# Patient Record
Sex: MALE | Race: WHITE | Employment: FULL TIME | RURAL
[De-identification: names, ages, dates, MRNs, and addresses within clinical notes are randomized per-mention and may not be internally consistent; named-entity substitution may affect disease eponyms.]

---

## 2018-07-05 ENCOUNTER — OFFICE VISIT (OUTPATIENT)
Dept: FAMILY MEDICINE CLINIC | Age: 48
End: 2018-07-05

## 2018-07-05 VITALS
HEIGHT: 71 IN | BODY MASS INDEX: 32.2 KG/M2 | RESPIRATION RATE: 20 BRPM | HEART RATE: 110 BPM | OXYGEN SATURATION: 97 % | SYSTOLIC BLOOD PRESSURE: 115 MMHG | WEIGHT: 230 LBS | DIASTOLIC BLOOD PRESSURE: 82 MMHG | TEMPERATURE: 97.8 F

## 2018-07-05 DIAGNOSIS — R00.0 TACHYCARDIA: ICD-10-CM

## 2018-07-05 DIAGNOSIS — I50.9 CONGESTIVE HEART FAILURE, UNSPECIFIED HF CHRONICITY, UNSPECIFIED HEART FAILURE TYPE (HCC): Primary | ICD-10-CM

## 2018-07-05 RX ORDER — METOPROLOL SUCCINATE 50 MG/1
50 TABLET, EXTENDED RELEASE ORAL DAILY
Qty: 30 TAB | Refills: 0 | Status: SHIPPED | OUTPATIENT
Start: 2018-07-05 | End: 2018-07-24 | Stop reason: SDUPTHER

## 2018-07-05 RX ORDER — FUROSEMIDE 20 MG/1
20 TABLET ORAL DAILY
Qty: 30 TAB | Refills: 0 | Status: SHIPPED | OUTPATIENT
Start: 2018-07-05 | End: 2018-07-10 | Stop reason: DRUGHIGH

## 2018-07-05 RX ORDER — FUROSEMIDE 20 MG/1
20 TABLET ORAL
COMMUNITY
Start: 2018-07-02 | End: 2018-07-05 | Stop reason: SDUPTHER

## 2018-07-05 NOTE — MR AVS SNAPSHOT
[facility] 
[phone] Patient: Steve Malloy MRN: [id number] RVW:4/96/2904 Visit Information Date & Time Provider Department Dept. Phone Encounter #  
 7/5/2018  8:20 AM Juvenal Ceballos  [facility] [phone] [id number] Your Appointments 7/10/2018  4:20 PM  
Any with Juvenal Ceballos MD  
[address] [place] [address] ([facility]) Appt Note: f/u medication $25 CP mbm  
 [phone] [phone]  
[phone]  
  
   
 [address] [address] Upcoming Health Maintenance Date Due DTaP/Tdap/Td series (1 - Tdap) 2/25/1989 FOBT Q 1 YEAR AGE 50-75 2/25/2018 Influenza Age 5 to Adult 8/1/2018 Allergies as of 7/5/2018  Review Complete On: 7/5/2018 By: Jayro Espinoza LPN No Known Allergies Current Immunizations  Never Reviewed No immunizations on file. Not reviewed this visit You Were Diagnosed With   
  
 Codes Comments Congestive heart failure, unspecified HF chronicity, unspecified heart failure type ([facility]    -  Primary ICD-10-CM: I50.9 ICD-9-CM: 428.0 Tachycardia     ICD-10-CM: R00.0 ICD-9-CM: 180. 0 Vitals BP Pulse Temp Resp Height(growth percentile) Weight(growth percentile) 115/82 (BP 1 Location: Right arm, BP Patient Position: Sitting) (!) 110 97.8 °F (36.6 °C) (Oral) 20 5' 11\" (1.803 m) 230 lb (104.3 kg) SpO2 BMI Smoking Status 97% 32.08 kg/m2 Former Smoker BMI and BSA Data Body Mass Index Body Surface Area 32.08 kg/m 2 2.29 m 2 Preferred Pharmacy Pharmacy Name Phone 575 [facility],7Th Floor, [address] [place] [address]  [phone] Your Updated Medication List  
  
   
This list is accurate as of 7/5/18  9:21 AM.  Always use your most recent med list.  
  
  
  
  
 furosemide 20 mg tablet Commonly known as:  LASIX Take 1 Tab by mouth daily for 5 days. metoprolol succinate 50 mg XL tablet Commonly known as:  TOPROL-XL Take 1 Tab by mouth daily. Prescriptions Sent to Pharmacy Refills  
 furosemide (LASIX) 20 mg tablet 0 Sig: Take 1 Tab by mouth daily for 5 days. Class: Normal  
 Pharmacy: [address] [place] [address] Dr Ph #: [phone] Route: Oral  
 metoprolol succinate (TOPROL-XL) 50 mg XL tablet 0 Sig: Take 1 Tab by mouth daily. Class: Normal  
 Pharmacy: [address] [place] [address] Dr Ph #: [phone] Route: Oral  
  
We Performed the Following AMB POC EKG ROUTINE W/ 12 LEADS, INTER & REP [54966 CPT(R)] BNP K034689 CPT(R)] CBC WITH AUTOMATED DIFF [08620 CPT(R)] COLLECTION VENOUS BLOOD,VENIPUNCTURE [id number] CPT(R)] LIPID PANEL WITH LDL/HDL RATIO [79042 CPT(R)] METABOLIC PANEL, BASIC [23282 CPT(R)] MO HANDLG&/OR CONVEY OF SPEC FOR TR OFFICE TO LAB [46828 CPT(R)] REFERRAL TO CARDIOLOGY [EPP57 Custom] To-Do List   
 07/05/2018 ECHO:  COLOR FLOW MAPPING ADULT Referral Information Referral ID Referred By Referred To  
  
 [id number] [facility], Antione Wang MD   
   [address] [place] [address] Way Phone: [phone] Fax: [phone] Visits Status Start Date End Date 1 New Request 7/5/18 7/5/19 If your referral has a status of pending review or denied, additional information will be sent to support the outcome of this decision. Introducing [facility] & HEALTH SERVICES! [facility] introduces Hastify patient portal. Now you can access parts of your medical record, email your doctor's office, and request medication refills online. 1. In your internet browser, go to [email or web address] 2. Click on the First Time User? Click Here link in the Sign In box.  You will see the New Member Sign Up page. 3. Enter your Boke Access Code exactly as it appears below. You will not need to use this code after youve completed the sign-up process. If you do not sign up before the expiration date, you must request a new code. · Boke Access Code: [email or web address] Expires: 10/3/2018  8:17 AM 
 
4. Enter the last four digits of your Social Security Number (xxxx) and Date of Birth (mm/dd/yyyy) as indicated and click Submit. You will be taken to the next sign-up page. 5. Create a BBC Easyt ID. This will be your Boke login ID and cannot be changed, so think of one that is secure and easy to remember. 6. Create a Boke password. You can change your password at any time. 7. Enter your Password Reset Question and Answer. This can be used at a later time if you forget your password. 8. Enter your e-mail address. You will receive e-mail notification when new information is available in [address] 9. Click Sign Up. You can now view and download portions of your medical record. 10. Click the Download Summary menu link to download a portable copy of your medical information. If you have questions, please visit the Frequently Asked Questions section of the Boke website. Remember, Boke is NOT to be used for urgent needs. For medical emergencies, dial 911. Now available from your iPhone and Android! Please provide this summary of care documentation to your next provider. If you have any questions after today's visit, please call [phone].

## 2018-07-05 NOTE — PROGRESS NOTES
HISTORY OF PRESENT ILLNESS  Chelsea Avalos is a 46 y.o. male. Chief Complaint   Patient presents with    \[facility]\"" Care     [facility] ED - pulmoonary edema, tachy       HPI  Went to ER 3 d ago  Chapis Girard there d/t cough for 3 w  That was not getting better, productive, clear to yellow mucus  Diagnosed with CHF  On Lasix 20 mg now, got 5 pills total only  Coughing and orthopnea getting a little better now  Weight stable    No recent tick bites or infections  Had one 4 y ago and was treated for Lyme ds then    FH: Uncle with enlarged heart age 28    SH: quit smoking in Feb, ETOH 6-8 beer on weekends, no drugs    Had EKG done, no Echo  Nl Troponin  High BNP  CXR showed a little emphysema only      Review of Systems   Constitutional: Negative for fever. HENT: Negative for congestion and sore throat. Respiratory: Positive for cough, sputum production, shortness of breath and wheezing (at night when lying down). Cardiovascular: Positive for orthopnea and leg swelling (for a mo). Negative for chest pain and palpitations. Gastrointestinal: Negative for abdominal pain, blood in stool, diarrhea, nausea and vomiting. Genitourinary: Negative for dysuria and hematuria. Musculoskeletal: Negative for joint pain and myalgias. Neurological: Negative for dizziness and headaches. No past medical history on file. No past surgical history on file. Current Outpatient Prescriptions   Medication Sig Dispense Refill    metoprolol succinate (TOPROL-XL) 50 mg XL tablet Take 1 Tab by mouth daily. 30 Tab 0    furosemide (LASIX) 20 mg tablet Take 1 Tab by mouth daily for 5 days. 30 Tab 0         No Known Allergies    Visit Vitals    /82 (BP 1 Location: Right arm, BP Patient Position: Sitting)    Pulse (!) 110    Temp 97.8 °F (36.6 °C) (Oral)    Resp 20    Ht 5' 11\" (1.803 m)    Wt 230 lb (104.3 kg)    SpO2 97%    BMI 32.08 kg/m2       Physical Exam   Constitutional: He is oriented to person, place, and time.  He appears well-developed and well-nourished. No distress. HENT:   Head: Normocephalic and atraumatic. Mouth/Throat: Oropharynx is clear and moist. No oropharyngeal exudate. Eyes: Conjunctivae and EOM are normal. Pupils are equal, round, and reactive to light. Cardiovascular: Normal rate and regular rhythm. Pulmonary/Chest: Effort normal and breath sounds normal.   Abdominal: He exhibits no distension. There is no tenderness. Musculoskeletal: He exhibits edema (trace bilat). Neurological: He is alert and oriented to person, place, and time. Skin: Skin is warm and dry. Psychiatric: He has a normal mood and affect. Nursing note and vitals reviewed. EKG with Tachycardia, ectopic beat I do not think he has Afib    ASSESSMENT and PLAN    ICD-10-CM ICD-9-CM    1. Congestive heart failure, unspecified HF chronicity, unspecified heart failure type (HCC) I50.9 428.0 LIPID PANEL WITH LDL/HDL RATIO      AMB POC EKG ROUTINE W/ 12 LEADS, INTER & REP      BNP      METABOLIC PANEL, BASIC      COLOR FLOW MAPPING ADULT      CBC WITH AUTOMATED DIFF      IA HANDLG&/OR CONVEY OF SPEC FOR TR OFFICE TO LAB      COLLECTION VENOUS BLOOD,VENIPUNCTURE      REFERRAL TO CARDIOLOGY   2.  Tachycardia R00.0 785.0 REFERRAL TO CARDIOLOGY   advised to start ASA 81 mg every day  Recheck next week  Go to ER if any CP

## 2018-07-06 LAB
BASOPHILS # BLD AUTO: 0 X10E3/UL (ref 0–0.2)
BASOPHILS NFR BLD AUTO: 1 %
BNP SERPL-MCNC: 780 PG/ML (ref 0–100)
BUN SERPL-MCNC: 13 MG/DL (ref 6–24)
BUN/CREAT SERPL: 11 (ref 9–20)
CALCIUM SERPL-MCNC: 9.7 MG/DL (ref 8.7–10.2)
CHLORIDE SERPL-SCNC: 105 MMOL/L (ref 96–106)
CHOLEST SERPL-MCNC: 181 MG/DL (ref 100–199)
CO2 SERPL-SCNC: 17 MMOL/L (ref 20–29)
CREAT SERPL-MCNC: 1.18 MG/DL (ref 0.76–1.27)
EOSINOPHIL # BLD AUTO: 0.1 X10E3/UL (ref 0–0.4)
EOSINOPHIL NFR BLD AUTO: 1 %
ERYTHROCYTE [DISTWIDTH] IN BLOOD BY AUTOMATED COUNT: 13.8 % (ref 12.3–15.4)
GLUCOSE SERPL-MCNC: 105 MG/DL (ref 65–99)
HCT VFR BLD AUTO: 42.1 % (ref 37.5–51)
HDLC SERPL-MCNC: 51 MG/DL
HGB BLD-MCNC: 13.8 G/DL (ref 13–17.7)
IMM GRANULOCYTES # BLD: 0 X10E3/UL (ref 0–0.1)
IMM GRANULOCYTES NFR BLD: 0 %
INTERPRETATION, 910389: NORMAL
LDLC SERPL CALC-MCNC: 124 MG/DL (ref 0–99)
LDLC/HDLC SERPL: 2.4 RATIO (ref 0–3.6)
LYMPHOCYTES # BLD AUTO: 0.9 X10E3/UL (ref 0.7–3.1)
LYMPHOCYTES NFR BLD AUTO: 11 %
MCH RBC QN AUTO: 28.6 PG (ref 26.6–33)
MCHC RBC AUTO-ENTMCNC: 32.8 G/DL (ref 31.5–35.7)
MCV RBC AUTO: 87 FL (ref 79–97)
MONOCYTES # BLD AUTO: 0.9 X10E3/UL (ref 0.1–0.9)
MONOCYTES NFR BLD AUTO: 11 %
NEUTROPHILS # BLD AUTO: 6.2 X10E3/UL (ref 1.4–7)
NEUTROPHILS NFR BLD AUTO: 76 %
PLATELET # BLD AUTO: 238 X10E3/UL (ref 150–379)
POTASSIUM SERPL-SCNC: 4.8 MMOL/L (ref 3.5–5.2)
RBC # BLD AUTO: 4.82 X10E6/UL (ref 4.14–5.8)
SODIUM SERPL-SCNC: 143 MMOL/L (ref 134–144)
TRIGL SERPL-MCNC: 31 MG/DL (ref 0–149)
VLDLC SERPL CALC-MCNC: 6 MG/DL (ref 5–40)
WBC # BLD AUTO: 8.1 X10E3/UL (ref 3.4–10.8)

## 2018-07-06 NOTE — PROGRESS NOTES
Call pt, the Chol is ok  The test for CHF is better, cont to take the Lasix and recheck in 1 mo  The sugar is a touch up, avoid conc sweets diet  The CBC is normal

## 2018-07-10 ENCOUNTER — OFFICE VISIT (OUTPATIENT)
Dept: FAMILY MEDICINE CLINIC | Age: 48
End: 2018-07-10

## 2018-07-10 VITALS
WEIGHT: 238.2 LBS | SYSTOLIC BLOOD PRESSURE: 102 MMHG | HEIGHT: 71 IN | DIASTOLIC BLOOD PRESSURE: 75 MMHG | OXYGEN SATURATION: 96 % | RESPIRATION RATE: 16 BRPM | BODY MASS INDEX: 33.35 KG/M2 | HEART RATE: 102 BPM | TEMPERATURE: 97.2 F

## 2018-07-10 DIAGNOSIS — I50.9 CONGESTIVE HEART FAILURE, UNSPECIFIED HF CHRONICITY, UNSPECIFIED HEART FAILURE TYPE (HCC): Primary | ICD-10-CM

## 2018-07-10 DIAGNOSIS — E66.9 OBESITY (BMI 30.0-34.9): ICD-10-CM

## 2018-07-10 DIAGNOSIS — Z12.11 COLON CANCER SCREENING: ICD-10-CM

## 2018-07-10 RX ORDER — FUROSEMIDE 40 MG/1
TABLET ORAL
Qty: 30 TAB | Refills: 0 | Status: SHIPPED | OUTPATIENT
Start: 2018-07-10 | End: 2018-07-24 | Stop reason: SDUPTHER

## 2018-07-10 NOTE — LETTER
NOTIFICATION RETURN TO WORK / SCHOOL 
 
7/17/2018 8:24 AM 
 
Mr. Ronak Cobian [address] [place] [id number] To Whom It May Concern: 
 
Ronak Cobian is currently under the care of [address] [place] [address]. He may return to work with no strenuous activity until follow up with cardiologist.  
 
If there are questions or concerns please have the patient contact our office.  
 
 
 
Sincerely, 
 
 
Radha Zimmerman MD

## 2018-07-10 NOTE — PROGRESS NOTES
Chief Complaint   Patient presents with    CHF     follow up new medication     Visit Vitals    /75 (BP 1 Location: Right arm, BP Patient Position: Sitting)    Pulse (!) 102    Temp 97.2 °F (36.2 °C) (Oral)    Resp 16    Ht 5' 11\" (1.803 m)    Wt 238 lb 3.2 oz (108 kg)    SpO2 96%    BMI 33.22 kg/m2     Health Maintenance Due   Topic Date Due    Pneumococcal 19-64 Medium Risk (1 of 1 - PPSV23) 02/25/1987    DTaP/Tdap/Td series (1 - Tdap) 02/25/1989    FOBT Q 1 YEAR AGE 50-75  02/25/2018     1. Have you been to the ER, urgent care clinic since your last visit? Hospitalized since your last visit? No    2. Have you seen or consulted any other health care providers outside of the [address] [place] [address] since your last visit? Include any pap smears or colon screening.  No

## 2018-07-10 NOTE — MR AVS SNAPSHOT
[facility] [address] 
[phone] Patient: Alireza Puckett MRN: [id number] ANQ:[birth year] Visit Information Date & Time Provider Department Dept. Phone Encounter #  
 7/10/2018  4:20 PM Nathan Soriano MD [address] [id number] Your Appointments 7/24/2018  4:00 PM  
Any with Nathan Soriano MD  
[facility] ([facility]) Appt Note: 2 week f/u BP med, [address]  
[phone]  
  
   
 [address] [address]  
  
    
 7/30/2018  2:20 PM  
New Patient with Megan Mart MD  
[facility] Appt Note: CHF per Dr Daphney Das / Clemencia Sims / Oswald Liu prior [address] [facility]  
 [address] [facility] [address] [address] [phone]  
  
   
 [address] [place] [address] Upcoming Health Maintenance Date Due Pneumococcal 19-64 Medium Risk (1 of 1 - PPSV23) 2/25/1987 DTaP/Tdap/Td series (1 - Tdap) 2/25/1989 FOBT Q 1 YEAR AGE 50-75 2/25/2018 Influenza Age 5 to Adult 8/1/2018 Allergies as of 7/10/2018  Review Complete On: 7/10/2018 By: Bao Castro RN No Known Allergies Current Immunizations  Never Reviewed No immunizations on file. Not reviewed this visit You Were Diagnosed With   
  
 Codes Comments Congestive heart failure, unspecified HF chronicity, unspecified heart failure type ([facility]    -  Primary ICD-10-CM: I50.9 ICD-9-CM: 428.0 Colon cancer screening     ICD-10-CM: Z12.11 ICD-9-CM: V76.51 Obesity (BMI 30.0-34.9)     ICD-10-CM: G76.0 ICD-9-CM: 278.00 Vitals BP Pulse Temp Resp Height(growth percentile) Weight(growth percentile)  102/75 (BP 1 Location: Right arm, BP Patient Position: Sitting) (!) 102 97.2 °F (36.2 °C) (Oral) 16 5' 11\" (1.803 m) 238 lb 3.2 oz (108 kg) SpO2 BMI Smoking Status 96% 33.22 kg/m2 Former Smoker BMI and BSA Data Body Mass Index Body Surface Area  
 33.22 kg/m 2 2.33 m 2 Preferred Pharmacy Pharmacy Name Phone [facility],7Th Floor, [address] [place] [address]  [phone] Your Updated Medication List  
  
   
This list is accurate as of 7/10/18  4:58 PM.  Always use your most recent med list.  
  
  
  
  
 ASPIR-81 PO Take  by mouth. furosemide 40 mg tablet Commonly known as:  LASIX Take one a day  
  
 metoprolol succinate 50 mg XL tablet Commonly known as:  TOPROL-XL Take 1 Tab by mouth daily. potassium 99 mg tablet Take 99 mg by mouth daily. Prescriptions Sent to Pharmacy Refills  
 furosemide (LASIX) 40 mg tablet 0 Sig: Take one a day Class: Normal  
 Pharmacy: [address] [place] [address],[address] [facility], [address] [place] [address]   #: [phone] To-Do List   
 07/16/2018 8:00 AM  
  Appointment with [address] [place] [address] [place] 2 at [place] [address] ([phone]) GENERAL INSTRUCTIONS - If you have a hand-written prescription for this exam, you must bring it with you on the day of your exam. - Bring all relevant prior images from facilities outside of [facility]. Failure to provide images may delay reading by Radiologist. - Children under the age of 15 may not be left unattended. - [address] [place] [address] patients must have an armband and be accompanied by a caregiver or family member. APPOINTMENT CANCELLATION - To reschedule or cancel an appointment, please contact the Scheduling Department at [phone].  
  
 08/09/2018 Lab:  OCCULT BLOOD, IMMUNOASSAY (FIT) Introducing [facility] & HEALTH SERVICES! [employer] introduces [email or web address] patient portal. Now you can access parts of your medical record, email your doctor's office, and request medication refills online. 1. In your internet browser, go to [email or web address] 2. Click on the First Time User? Click Here link in the Sign In box. You will see the New Member Sign Up page. 3. Enter your Inway Studios Access Code exactly as it appears below. You will not need to use this code after youve completed the sign-up process. If you do not sign up before the expiration date, you must request a new code. · Inway Studios Access Code: [email or web address] Expires: 10/3/2018  8:17 AM 
 
4. Enter the last four digits of your Social Security Number (xxxx) and Date of Birth (mm/dd/yyyy) as indicated and click Submit. You will be taken to the next sign-up page. 5. Create a Inway Studios ID. This will be your Inway Studios login ID and cannot be changed, so think of one that is secure and easy to remember. 6. Create a Inway Studios password. You can change your password at any time. 7. Enter your Password Reset Question and Answer. This can be used at a later time if you forget your password. 8. Enter your e-mail address. You will receive e-mail notification when new information is available in [address] 9. Click Sign Up. You can now view and download portions of your medical record. 10. Click the Download Summary menu link to download a portable copy of your medical information. If you have questions, please visit the Frequently Asked Questions section of the Inway Studios website. Remember, Inway Studios is NOT to be used for urgent needs. For medical emergencies, dial 911. Now available from your iPhone and Android! Please provide this summary of care documentation to your next provider. If you have any questions after today's visit, please call [phone].

## 2018-07-10 NOTE — PROGRESS NOTES
HISTORY OF PRESENT ILLNESS  Jayden Craig is a 46 y.o. male. Chief Complaint   Patient presents with    CHF     follow up new medication       HPI  Weight up but  Wearing new boots with steel cups now  Has not weighed himself at home lately    SOB is not as bad  Still coughing at night when lying down  On Lasix 20 mg every day    Mon has the Echo, in 6 d  Sees Card on 7/30    No problems with BP med, Toprol XL  Pulse improved  No SE    Coughing up foamy liquid still dariusz when lying down  Last BNP better    Obesity    Review of Systems   Constitutional: Negative for malaise/fatigue. Respiratory: Positive for cough (when lying down). Cardiovascular: Negative for chest pain, orthopnea and leg swelling. Neurological: Negative for dizziness and headaches. No past medical history on file. No past surgical history on file. Current Outpatient Prescriptions   Medication Sig Dispense Refill    potassium 99 mg tablet Take 99 mg by mouth daily.  aspirin (ASPIR-81 PO) Take  by mouth.  furosemide (LASIX) 40 mg tablet Take one a day 30 Tab 0    metoprolol succinate (TOPROL-XL) 50 mg XL tablet Take 1 Tab by mouth daily. 30 Tab 0       No Known Allergies    Visit Vitals    /75 (BP 1 Location: Right arm, BP Patient Position: Sitting)    Pulse (!) 102    Temp 97.2 °F (36.2 °C) (Oral)    Resp 16    Ht 5' 11\" (1.803 m)    Wt 238 lb 3.2 oz (108 kg)    SpO2 96%    BMI 33.22 kg/m2       Physical Exam   Constitutional: He is oriented to person, place, and time. He appears well-developed and well-nourished. No distress. HENT:   Head: Normocephalic and atraumatic. Mouth/Throat: Oropharynx is clear and moist. No oropharyngeal exudate. Eyes: Conjunctivae and EOM are normal.   Neck: Neck supple. Cardiovascular: Normal rate, regular rhythm and normal heart sounds. Exam reveals no gallop. No murmur heard. Pulmonary/Chest: Effort normal and breath sounds normal.   Abdominal: Soft.  He exhibits no distension. There is no tenderness. Musculoskeletal: He exhibits no edema. Lymphadenopathy:     He has no cervical adenopathy. Neurological: He is alert and oriented to person, place, and time. Skin: Skin is warm and dry. Psychiatric: He has a normal mood and affect. Nursing note and vitals reviewed. ASSESSMENT and PLAN    ICD-10-CM ICD-9-CM    1. Congestive heart failure, unspecified HF chronicity, unspecified heart failure type (HCC) I50.9 428.0 furosemide (LASIX) 40 mg tablet  Increased from 20 mg  Recheck in 2 w  Monitor weight  Cont Toprol XL   2. Colon cancer screening Z12.11 V76.51 OCCULT BLOOD, IMMUNOASSAY (FIT)   3. Obesity (BMI 30.0-34. 9) E66.9 278.00    Diet, exercise and weight loss discussed.

## 2018-07-16 DIAGNOSIS — I42.9 CARDIOMYOPATHY, UNSPECIFIED TYPE (HCC): Primary | ICD-10-CM

## 2018-07-16 RX ORDER — LISINOPRIL 2.5 MG/1
2.5 TABLET ORAL DAILY
Qty: 30 TAB | Refills: 3 | Status: SHIPPED | OUTPATIENT
Start: 2018-07-16 | End: 2018-09-12 | Stop reason: ALTCHOICE

## 2018-07-17 ENCOUNTER — DOCUMENTATION ONLY (OUTPATIENT)
Dept: FAMILY MEDICINE CLINIC | Age: 48
End: 2018-07-17

## 2018-07-17 NOTE — PROGRESS NOTES
Spoke with the  at [facility] in [place] they can work the patient in on Thursday 07/19/2018 at 1:30pm arrive at 1:00pm with Dr Jere De La Vega

## 2018-07-17 NOTE — PROGRESS NOTES
Per patient's request, I have spoken to his girlfriend Alex Rater) about his Echocardiogram done on 7/16/18. Dr Yen Rodriguez talked to him yesterday about the results but he didn't understand enough to be able to relay the results to her. Per Dr Yen Rodriguez, patient's Ejection Fraction of 20%-25%, this may be due to Cardiomyopathy (weakening of heart muscle causing it to have trouble pumping). He is on Metoprolol and Dr Yen Rodriguez has started him on an ACE Inhibitor to help decrease the heart's work load. She suggest that he not do any strenuous activity until he follows up with Cardiology. He has an apt with Dr Og Baptiste on 7/30/18, Sonia Liu, Antonio Perry will call and try to get a sooner apt in the [place] office. Patient's girlfriend verbalizes understanding.

## 2018-07-19 ENCOUNTER — OFFICE VISIT (OUTPATIENT)
Dept: CARDIOLOGY CLINIC | Age: 48
End: 2018-07-19

## 2018-07-19 VITALS
WEIGHT: 226.5 LBS | HEIGHT: 71 IN | DIASTOLIC BLOOD PRESSURE: 70 MMHG | SYSTOLIC BLOOD PRESSURE: 98 MMHG | RESPIRATION RATE: 18 BRPM | HEART RATE: 99 BPM | OXYGEN SATURATION: 96 % | BODY MASS INDEX: 31.71 KG/M2

## 2018-07-19 DIAGNOSIS — Z76.89 ENCOUNTER TO ESTABLISH CARE: ICD-10-CM

## 2018-07-19 DIAGNOSIS — E78.2 MIXED HYPERLIPIDEMIA: ICD-10-CM

## 2018-07-19 DIAGNOSIS — I42.9 CARDIOMYOPATHY, UNSPECIFIED TYPE (HCC): Primary | ICD-10-CM

## 2018-07-19 DIAGNOSIS — R06.02 SOB (SHORTNESS OF BREATH): ICD-10-CM

## 2018-07-19 NOTE — PROGRESS NOTES
1. Have you been to the ER, urgent care clinic since your last visit? Hospitalized since your last visit? Seen in ER in [place] in [facility] for cough. 2. Have you seen or consulted any other health care providers outside of the [address] [place] [address] since your last visit? Include any pap smears or colon screening. Seen in [facility] for the echo on Monday. ECHO IN [facility].       Chief Complaint   Patient presents with    New Patient     referred by PCP for abnormal echo-soboe, swelling in ankles

## 2018-07-19 NOTE — MR AVS SNAPSHOT
Annabel Belle [address] [facility] 
[phone] Patient: Yuli Middleton MRN: [id number] NVM:[id number] Visit Information Date & Time Provider Department Dept. Phone Encounter #  
 7/19/2018  1:30 PM Trudi Salcedo, [facility] [phone] [id number] Your Appointments 7/24/2018  4:00 PM  
Any with Prosper Moffett MD  
[address] [place] [address] ([facility] Appt Note: 2 week f/u BP med, [address]  
[phone]  
  
   
 [address]  
  
    
 8/3/2018  9:30 AM  
NUCLEAR MEDICINE with NUCLEAR, [facility]) Appt Note: $50CP 7/19/18ksr /per Dr Luci Mg 5'11\"  220lbs/STRESS TEST LEXISCAN/CARDIOLITE [[id number] Custom] [address] [facility]  
[phone] [address] [facility] Upcoming Health Maintenance Date Due Pneumococcal 19-64 Medium Risk (1 of 1 - PPSV23) 2/25/1987 DTaP/Tdap/Td series (1 - Tdap) 2/25/1989 FOBT Q 1 YEAR AGE 50-75 2/25/2018 Influenza Age 5 to Adult 8/1/2018 Allergies as of 7/19/2018  Review Complete On: 7/19/2018 By: Gurpreet Pino LPN No Known Allergies Current Immunizations  Never Reviewed No immunizations on file. Not reviewed this visit You Were Diagnosed With   
  
 Codes Comments Cardiomyopathy, unspecified type ([facility]    -  Primary ICD-10-CM: I42.9 ICD-9-CM: 425.4 SOB (shortness of breath)     ICD-10-CM: R06.02 
ICD-9-CM: 786.05 Encounter to establish care     ICD-10-CM: Z76.89 
ICD-9-CM: V65.8 Mixed hyperlipidemia     ICD-10-CM: E78.2 ICD-9-CM: 272.2 Vitals BP Pulse Resp Height(growth percentile) Weight(growth percentile) SpO2 98/70 (BP 1 Location: Left arm, BP Patient Position: Sitting) 99 18 5' 11\" (1.803 m) 226 lb 8 oz (102.7 kg) 96% BMI Smoking Status 31.59 kg/m2 Former Smoker Vitals History BMI and BSA Data Body Mass Index Body Surface Area  
 31.59 kg/m 2 2.27 m 2 Preferred Pharmacy Pharmacy Name Phone [facility],[address], [address] [place] [address]  [phone] Your Updated Medication List  
  
   
This list is accurate as of 7/19/18  2:21 PM.  Always use your most recent med list.  
  
  
  
  
 ASPIR-81 PO Take 81 mg by mouth daily. furosemide 40 mg tablet Commonly known as:  LASIX Take one a day  
  
 lisinopril 2.5 mg tablet Commonly known as:  Sapna Erick Take 1 Tab by mouth daily. metoprolol succinate 50 mg XL tablet Commonly known as:  TOPROL-XL Take 1 Tab by mouth daily. potassium 99 mg tablet Take 99 mg by mouth daily. We Performed the Following AMB POC EKG ROUTINE W/ 12 LEADS, INTER & REP [79785 CPT(R)] To-Do List   
 07/19/2018 ECG:  STRESS TEST LEXISCAN/CARDIOLITE Introducing [facility]! [employer] introduces [employer] patient portal. Now you can access parts of your medical record, email your doctor's office, and request medication refills online. 1. In your internet browser, go to [email or web address] (Formerly Nearlyweds). [email or web address] (Former[employer] Nearly[employer]eds) 2. Click on the First Time User? Click Here link in the Sign In box. You will see the New Member Sign Up page. 3. Enter your Livestar Access Code exactly as it appears below. You will not need to use this code after youve completed the sign-up process. If you do not sign up before the expiration date, you must request a new code. · Livestar Access Code: [email or web address] Expires: 10/3/2018  8:17 AM 
 
4. Enter the last four digits of your Social Security Number (xxxx) and Date of Birth (mm/dd/yyyy) as indicated and click Submit.  You will be taken to the next sign-up page. 5. Create a Mobile Factory ID. This will be your Mobile Factory login ID and cannot be changed, so think of one that is secure and easy to remember. 6. Create a Mobile Factory password. You can change your password at any time. 7. Enter your Password Reset Question and Answer. This can be used at a later time if you forget your password. 8. Enter your e-mail address. You will receive e-mail notification when new information is available in [address]. 9. Click Sign Up. You can now view and download portions of your medical record. 10. Click the Download Summary menu link to download a portable copy of your medical information. If you have questions, please visit the Frequently Asked Questions section of the Mobile Factory website. Remember, Mobile Factory is NOT to be used for urgent needs. For medical emergencies, dial 911. Now available from your iPhone and Android! Please provide this summary of care documentation to your next provider. Your primary care clinician is listed as Suzanne Win. If you have any questions after today's visit, please call [phone].

## 2018-07-19 NOTE — PROGRESS NOTES
[address] [address]  [phone] Subjective:  
  
Delmi Hughes is a 46 y.o. male newly dx HFrEF who presents to clinic to establish care. Per pt, he's c/o SOB, LE swelling, and cough x 3 wks, decided to go to [facility] ED and was informed that his EKG showed \"old heart attack\". He followed up with his PCP who obtained echocardiogram.  Currently, he reports improvement with all his symptoms. 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 Denies chest pain, orthopnea, PND,  palpitations, syncope, or presyncope. Cardiac risk factors: HTN, HLD, age, M,  elevated BMI,  family hx of CAD Hx smoking: former   Alcohol: social   Illegal drug use: denies Family hx: mother: HF  father: Rico Nieves  Siblings: ok Patient Active Problem List  
 Diagnosis Date Noted  Cardiomyopathy ([facility] 07/16/2018 Jere Pererya MD 
No past medical history on file. No past surgical history on file. No Known Allergies No family history on file. Social History Social History  Marital status: SINGLE Spouse name: N/A  
 Number of children: N/A  
 Years of education: N/A Occupational History  Not on file. Social History Main Topics  Smoking status: Former Smoker Types: Cigarettes Quit date: 2/19/2018  Smokeless tobacco: Current User Types: Chew  Alcohol use Yes Comment: on occassion  Drug use: No  
 Sexual activity: Yes Other Topics Concern  Not on file Social History Narrative Current Outpatient Prescriptions Medication Sig  
 lisinopril (PRINIVIL, ZESTRIL) 2.5 mg tablet Take 1 Tab by mouth daily.  potassium 99 mg tablet Take 99 mg by mouth daily.  aspirin (ASPIR-81 PO) Take 81 mg by mouth daily.  furosemide (LASIX) 40 mg tablet Take one a day (Patient taking differently: Take 40 mg by mouth daily. Take one a day)  metoprolol succinate (TOPROL-XL) 50 mg XL tablet Take 1 Tab by mouth daily. No current facility-administered medications for this visit. Review of Symptoms: 
11 systems reviewed, negative other than as stated in the HPI Physical ExamPhysical Exam:   
Vitals:  
 07/19/18 1312 07/19/18 1326 BP: 100/70 98/70 Pulse: 99 Resp: 18 SpO2: 96% Weight: 226 lb 8 oz (102.7 kg) Height: 5' 11\" (1.803 m) Body mass index is 31.59 kg/(m^2). General PE Gen:  NAD Mental Status - Alert. General Appearance - Not in acute distress. Chest and Lung Exam  
Inspection: Accessory muscles - No use of accessory muscles in breathing. Auscultation:  
Breath sounds: - Normal.  
Cardiovascular Inspection: Jugular vein - Bilateral - Inspection Normal.  
Palpation/Percussion:  
Apical Impulse: - Normal.  
Auscultation: Rhythm - Regular. Heart Sounds - S1 WNL and S2 WNL. No S3 or S4. Murmurs & Other Heart Sounds: Auscultation of the heart reveals - No Murmurs. Peripheral Vascular Upper Extremity: Inspection - Bilateral - No Cyanotic nailbeds or Digital clubbing. Lower Extremity:  
Palpation: Edema - Bilateral - +1 Abdomen:   Soft, non-tender, bowel sounds are active. Neuro: A&O times 3, CN and motor grossly WNL Labs:  
Lab Results Component Value Date/Time Cholesterol, total 181 07/05/2018 08:49 AM  
 HDL Cholesterol 51 07/05/2018 08:49 AM  
 LDL, calculated 124 (H) 07/05/2018 08:49 AM  
 Triglyceride 31 07/05/2018 08:49 AM  
 
No results found for: CPK, CPKX, CPX Lab Results Component Value Date/Time  Sodium 143 07/05/2018 08:49 AM  
 Potassium 4.8 07/05/2018 08:49 AM  
 Chloride 105 07/05/2018 08:49 AM  
 CO2 17 (L) 07/05/2018 08:49 AM  
 Glucose 105 (H) 07/05/2018 08:49 AM  
 BUN 13 07/05/2018 08:49 AM  
 Creatinine 1.18 07/05/2018 08:49 AM  
 BUN/Creatinine ratio 11 07/05/2018 08:49 AM  
 GFR est AA 83 07/05/2018 08:49 AM  
 GFR est non-AA 72 07/05/2018 08:49 AM  
 Calcium 9.7 07/05/2018 08:49 AM  
 
 
EKG: 
NSR  
 
 Assessment: 
 
 Assessment: 1. Cardiomyopathy, unspecified type ([address] 2. SOB (shortness of breath) 3. Encounter to establish care 4. Mixed hyperlipidemia Orders Placed This Encounter  AMB POC EKG ROUTINE W/ 12 LEADS, INTER & REP Order Specific Question:   Reason for Exam: Answer:   routine Plan: This is a 46 y.o. male newly dx HFrEF who presents to clinic to establish care. Per pt, he's c/o SOB, LE swelling, and cough x 3 wks, decided to go to [facility] ED and was informed that his EKG showed \"old heart attack\". He followed up with his PCP who obtained echocardiogram.  Currently, he reports improvement with all his symptoms. 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 Cardiac risk factors: HTN, HLD, age, M,  elevated BMI,  family hx of CAD, former smoker CM 
EF 20-25%, mild MR/pulmonary HTN per Echo 07/18 Continue Toprol XL, low dose Lisinopril, Furosemide Recent labwork 07/18 showed stable Cr,  (improved per PCP) which she intends to repeat in 1 month Will purse ischemic work up and obtain Purvi Lestergomery Will consider switching to Coreg/Entresto in future HLD 
07/18 LDL at 124. Lipids and labs followed by PCP. Continue current care and f/u when testing complete Alexey Ding NP [facility] 7/19/2018 Patient seen, examined by me personally. Plan discussed as detailed. Agree with note as outlined by  NP. I confirm findings in history and physical exam. No additional findings noted. Agree with plan as outlined above. Discussed echo findings and management options including ICD. Junito Martinez MD

## 2018-07-24 ENCOUNTER — OFFICE VISIT (OUTPATIENT)
Dept: FAMILY MEDICINE CLINIC | Age: 48
End: 2018-07-24

## 2018-07-24 VITALS
HEART RATE: 99 BPM | SYSTOLIC BLOOD PRESSURE: 90 MMHG | RESPIRATION RATE: 18 BRPM | TEMPERATURE: 97.4 F | HEIGHT: 71 IN | WEIGHT: 227.2 LBS | OXYGEN SATURATION: 98 % | DIASTOLIC BLOOD PRESSURE: 61 MMHG | BODY MASS INDEX: 31.81 KG/M2

## 2018-07-24 DIAGNOSIS — I42.9 CARDIOMYOPATHY, UNSPECIFIED TYPE (HCC): ICD-10-CM

## 2018-07-24 DIAGNOSIS — I50.9 CONGESTIVE HEART FAILURE, UNSPECIFIED HF CHRONICITY, UNSPECIFIED HEART FAILURE TYPE (HCC): Primary | ICD-10-CM

## 2018-07-24 RX ORDER — FUROSEMIDE 40 MG/1
TABLET ORAL
Qty: 30 TAB | Refills: 3 | Status: SHIPPED | OUTPATIENT
Start: 2018-07-24 | End: 2018-09-12 | Stop reason: SDUPTHER

## 2018-07-24 RX ORDER — METOPROLOL SUCCINATE 50 MG/1
50 TABLET, EXTENDED RELEASE ORAL DAILY
Qty: 30 TAB | Refills: 3 | Status: SHIPPED | OUTPATIENT
Start: 2018-07-24 | End: 2018-09-12 | Stop reason: ALTCHOICE

## 2018-07-24 NOTE — PROGRESS NOTES
HISTORY OF PRESENT ILLNESS  George Brice is a 46 y.o. male. Chief Complaint   Patient presents with    CHF     f/u, started new med, Lisinopril       HPI  Saw Card for CHF and CM  Will have stress test on 8/3  Wants to treat with meds first before Defibrillator    Weight stable  Gained a little  Still on Lasix  And on Metoprolol and needs refills    No SE with Lisinopril now    Had occ cramps but lately better    Snoring at night    Review of Systems   Respiratory: Positive for shortness of breath (still a little with SOB when bending over). Negative for cough. Cardiovascular: Negative for chest pain, palpitations and leg swelling. Neurological: Negative for dizziness and headaches. Past Medical History:   Diagnosis Date    [facility] (congestive heart failure) ([facility] 07/2018       History reviewed. No pertinent surgical history. Current Outpatient Prescriptions   Medication Sig Dispense Refill    furosemide (LASIX) 40 mg tablet Take one a day 30 Tab 3    metoprolol succinate (TOPROL-XL) 50 mg XL tablet Take 1 Tab by mouth daily. 30 Tab 3    lisinopril (PRINIVIL, ZESTRIL) 2.5 mg tablet Take 1 Tab by mouth daily. 30 Tab 3    potassium 99 mg tablet Take 99 mg by mouth daily.  aspirin (ASPIR-81 PO) Take 81 mg by mouth daily. No Known Allergies    Visit Vitals    BP 90/61 (BP 1 Location: Left arm, BP Patient Position: Sitting)    Pulse 99    Temp 97.4 °F (36.3 °C) (Oral)    Resp 18    Ht 5' 11\" (1.803 m)    Wt 227 lb 3.2 oz (103.1 kg)    SpO2 98%    BMI 31.69 kg/m2     Physical Exam   Constitutional: He is oriented to person, place, and time. He appears well-developed and well-nourished. No distress. HENT:   Head: Normocephalic and atraumatic. Mouth/Throat: Oropharynx is clear and moist. No oropharyngeal exudate. Eyes: Conjunctivae and EOM are normal.   Cardiovascular: Normal rate and regular rhythm. Pulmonary/Chest: Effort normal and breath sounds normal.   Abdominal: Soft.  He exhibits no distension. Musculoskeletal: He exhibits no edema. Lymphadenopathy:     He has no cervical adenopathy. Neurological: He is alert and oriented to person, place, and time. Skin: Skin is warm and dry. Psychiatric: He has a normal mood and affect. Nursing note and vitals reviewed. ASSESSMENT and PLAN    ICD-10-CM ICD-9-CM    1. Congestive heart failure, unspecified HF chronicity, unspecified heart failure type ([facility]) I50.9 428.0 furosemide (LASIX) 40 mg tablet   2.  Cardiomyopathy, unspecified type ([address] I42.9 425.4 metoprolol succinate (TOPROL-XL) 50 mg XL tablet   F/U with Cardiologist  Wife advised to Monitor pt for apnea

## 2018-07-24 NOTE — PROGRESS NOTES
Chief Complaint   Patient presents with    CHF     f/u, started new med, Lisinopril     Health Maintenance Due   Topic Date Due    Pneumococcal 19-64 Medium Risk (1 of 1 - PPSV23) 02/25/1987    DTaP/Tdap/Td series (1 - Tdap) 02/25/1989    FOBT Q 1 YEAR AGE 50-75  02/25/2018     Visit Vitals    BP 90/61 (BP 1 Location: Left arm, BP Patient Position: Sitting)    Pulse 99    Temp 97.4 °F (36.3 °C) (Oral)    Resp 18    Ht 5' 11\" (1.803 m)    Wt 227 lb 3.2 oz (103.1 kg)    SpO2 98%    BMI 31.69 kg/m2     1. Have you been to the ER, urgent care clinic since your last visit? Hospitalized since your last visit? No    2. Have you seen or consulted any other health care providers outside of the [address] [place] [address] since your last visit? Include any pap smears or colon screening.  No    Whitney Haddad, YANE

## 2018-08-03 ENCOUNTER — CLINICAL SUPPORT (OUTPATIENT)
Dept: CARDIOLOGY CLINIC | Age: 48
End: 2018-08-03

## 2018-08-03 DIAGNOSIS — E78.2 MIXED HYPERLIPIDEMIA: ICD-10-CM

## 2018-08-03 DIAGNOSIS — Z76.89 ENCOUNTER TO ESTABLISH CARE: ICD-10-CM

## 2018-08-03 DIAGNOSIS — I42.9 CARDIOMYOPATHY, UNSPECIFIED TYPE (HCC): ICD-10-CM

## 2018-08-03 DIAGNOSIS — R93.1 ABNORMAL NUCLEAR CARDIAC IMAGING TEST: Primary | ICD-10-CM

## 2018-08-03 DIAGNOSIS — R06.02 SOB (SHORTNESS OF BREATH): ICD-10-CM

## 2018-09-12 ENCOUNTER — OFFICE VISIT (OUTPATIENT)
Dept: CARDIOLOGY CLINIC | Age: 48
End: 2018-09-12

## 2018-09-12 VITALS
BODY MASS INDEX: 32 KG/M2 | HEART RATE: 102 BPM | SYSTOLIC BLOOD PRESSURE: 102 MMHG | DIASTOLIC BLOOD PRESSURE: 62 MMHG | RESPIRATION RATE: 16 BRPM | HEIGHT: 71 IN | WEIGHT: 228.6 LBS | OXYGEN SATURATION: 98 %

## 2018-09-12 DIAGNOSIS — I50.9 CONGESTIVE HEART FAILURE, UNSPECIFIED HF CHRONICITY, UNSPECIFIED HEART FAILURE TYPE (HCC): ICD-10-CM

## 2018-09-12 DIAGNOSIS — I25.2 MYOCARDIAL INFARCT, OLD: ICD-10-CM

## 2018-09-12 DIAGNOSIS — I42.9 CARDIOMYOPATHY, UNSPECIFIED TYPE (HCC): Primary | ICD-10-CM

## 2018-09-12 RX ORDER — CARVEDILOL 6.25 MG/1
6.25 TABLET ORAL 2 TIMES DAILY WITH MEALS
Qty: 60 TAB | Refills: 3 | Status: SHIPPED | OUTPATIENT
Start: 2018-09-12 | End: 2019-01-14 | Stop reason: SDUPTHER

## 2018-09-12 RX ORDER — FUROSEMIDE 20 MG/1
20 TABLET ORAL DAILY
Qty: 30 TAB | Refills: 3 | Status: SHIPPED | OUTPATIENT
Start: 2018-09-12 | End: 2018-10-03 | Stop reason: SDUPTHER

## 2018-09-12 NOTE — PROGRESS NOTES
Chief Complaint   Patient presents with    Results     stress test results     1. Have you been to the ER, urgent care clinic since your last visit? Hospitalized since your last visit? No    2. Have you seen or consulted any other health care providers outside of the [address] [place] [address] since your last visit? Include any pap smears or colon screening.  No

## 2018-09-12 NOTE — PROGRESS NOTES
Devika Hurtado DNP, ANP-BC  Subjective/HPI:     Perry Setting is a 46 y.o. male is here for follow-up from abnormal nuclear stress test indicating ejection fraction 21% confirming echo reading with large inferior septal infarct no ischemia. Patient denies lightheadedness or dizziness, does admit to fatigue and generalized weakness. Findings: The overall quality of the study is excellent. Attenuation artifact was Noted. Left ventricular cavity is noted to be severely dilated on the rest and stress studies. Additionally, the right ventricle is normal.     SPECT images demonstrate a large, fixed abnormality of severe degree in the inferior and septal region   on the stress and rest images. Gated SPECT images demonstrates dyskinesis of the inferior, septal and apical region. The left ventricular ejection fraction was calculated to be 21 %. Impression:   Myocardial perfusion imaging is abnormal: there is a large area of infarction in the infero-septal wall. Overall left ventricular systolic function was abnormal: with regional wall motion abnormalities (as noted above). These test results indicate high likelihood for the presence of angiographically significant coronary artery disease    PCP Provider  Key Johnson MD  Past Medical History:   Diagnosis Date    CAD (coronary artery disease) 08/2018    abnormal stress tests    [facility]estive [facility] failure) ([facility] 07/2018      History reviewed. No pertinent surgical history. No Known Allergies   History reviewed. No pertinent family history. Current Outpatient Prescriptions   Medication Sig    furosemide (LASIX) 20 mg tablet Take 1 Tab by mouth daily. Reduced 9/12/18    carvedilol (COREG) 6.25 mg tablet Take 1 Tab by mouth two (2) times daily (with meals). D/C metoprolol    sacubitril-valsartan (ENTRESTO) 24 mg/26 mg tablet Take 1 Tab by mouth two (2) times a day. Stop Lisinopril, start this new med in 2 days.     potassium 99 mg tablet Take 99 mg by mouth daily.  aspirin (ASPIR-81 PO) Take 81 mg by mouth daily. No current facility-administered medications for this visit. Vitals:    09/12/18 1341 09/12/18 1346   BP: 112/70 102/62   Pulse: (!) 102    Resp: 16    SpO2: 98%    Weight: 228 lb 9.6 oz (103.7 kg)    Height: 5' 11\" (1.803 m)      Social History     Social History    Marital status: SINGLE     Spouse name: N/A    Number of children: N/A    Years of education: N/A     Occupational History    Not on file. Social History Main Topics    Smoking status: Former Smoker     Packs/day: 1.00     Years: 35.00     Types: Cigarettes     Quit date: 2/19/2018    Smokeless tobacco: Current User     Types: Chew    Alcohol use Yes      Comment: on occassion    Drug use: No    Sexual activity: Yes     Other Topics Concern    Not on file     Social History Narrative       I have reviewed the nurses notes, vitals, problem list, allergy list, medical history, family, social history and medications. Review of Symptoms:    General: Pt denies excessive weight gain or loss. Pt is able to conduct ADL's  HEENT: Denies blurred vision, headaches, epistaxis and difficulty swallowing. Respiratory: Denies shortness of breath, HERRERA, wheezing or stridor. Cardiovascular: Denies precordial pain, palpitations, edema or PND  Gastrointestinal: Denies poor appetite, indigestion, abdominal pain or blood in stool  Musculoskeletal: Denies pain or swelling from muscles or joints  Neurologic: Denies tremor, paresthesias, or sensory motor disturbance  Skin: Denies rash, itching or texture change. Physical Exam:      General: Well developed, in no acute distress, cooperative and alert  HEENT: No carotid bruits, no JVD, trach is midline. Neck Supple, PEERL, EOM intact. Heart:  Normal S1/S2 negative S3 or S4.  Regular, no murmur, gallop or rub.   Respiratory: Clear bilaterally x 4, no wheezing or rales  Abdomen:   Soft, non-tender, no masses, bowel sounds are active.   Extremities:  No edema, normal cap refill, no cyanosis, atraumatic. Neuro: A&Ox3, speech clear, gait stable. Skin: Skin color is normal. No rashes or lesions. Non diaphoretic  Vascular: 2+ pulses symmetric in all extremities    Cardiographics    ECG: Left bundle branch block  No results found for this or any previous visit. Cardiology Labs:  Lab Results   Component Value Date/Time    Cholesterol, total 181 07/05/2018 08:49 AM    HDL Cholesterol 51 07/05/2018 08:49 AM    LDL, calculated 124 (H) 07/05/2018 08:49 AM    Triglyceride 31 07/05/2018 08:49 AM       Lab Results   Component Value Date/Time    Sodium 143 07/05/2018 08:49 AM    Potassium 4.8 07/05/2018 08:49 AM    Chloride 105 07/05/2018 08:49 AM    CO2 17 (L) 07/05/2018 08:49 AM    Glucose 105 (H) 07/05/2018 08:49 AM    BUN 13 07/05/2018 08:49 AM    Creatinine 1.18 07/05/2018 08:49 AM    BUN/Creatinine ratio 11 07/05/2018 08:49 AM    GFR est AA 83 07/05/2018 08:49 AM    GFR est non-AA 72 07/05/2018 08:49 AM    Calcium 9.7 07/05/2018 08:49 AM           Assessment:     Assessment:     Diagnoses and all orders for this visit:    1. Cardiomyopathy, unspecified type ([facility]  -     METABOLIC PANEL, COMPREHENSIVE  -     CBC WITH AUTOMATED DIFF  -     PROTHROMBIN TIME + INR  -     CARDIAC CATHETERIZATION; Future    2. Congestive heart failure, unspecified HF chronicity, unspecified heart failure type (HCC)  -     furosemide (LASIX) 20 mg tablet; Take 1 Tab by mouth daily. Reduced 0/94/07  -     METABOLIC PANEL, COMPREHENSIVE  -     CBC WITH AUTOMATED DIFF  -     PROTHROMBIN TIME + INR  -     CARDIAC CATHETERIZATION; Future    3. Myocardial infarct, old    Other orders  -     carvedilol (COREG) 6.25 mg tablet; Take 1 Tab by mouth two (2) times daily (with meals). D/C metoprolol  -     sacubitril-valsartan (ENTRESTO) 24 mg/26 mg tablet; Take 1 Tab by mouth two (2) times a day. Stop Lisinopril, start this new med in 2 days.         ICD-10-CM ICD-9-CM    1. Cardiomyopathy, unspecified type ([facility] Z84.1 633.9 METABOLIC PANEL, COMPREHENSIVE      CBC WITH AUTOMATED DIFF      PROTHROMBIN TIME + INR      CARDIAC CATHETERIZATION   2. Congestive heart failure, unspecified HF chronicity, unspecified heart failure type (HCC) I50.9 428.0 furosemide (LASIX) 20 mg tablet      METABOLIC PANEL, COMPREHENSIVE      CBC WITH AUTOMATED DIFF      PROTHROMBIN TIME + INR      CARDIAC CATHETERIZATION   3. Myocardial infarct, old I25.2 412      Orders Placed This Encounter    METABOLIC PANEL, COMPREHENSIVE    CBC WITH AUTOMATED DIFF    PROTHROMBIN TIME + INR    CARDIAC CATHETERIZATION     Standing Status:   Future     Standing Expiration Date:   3/12/2019     Order Specific Question:   Reason for Exam:     Answer:   CHF    furosemide (LASIX) 20 mg tablet     Sig: Take 1 Tab by mouth daily. Reduced 9/12/18     Dispense:  30 Tab     Refill:  3    carvedilol (COREG) 6.25 mg tablet     Sig: Take 1 Tab by mouth two (2) times daily (with meals). D/C metoprolol     Dispense:  60 Tab     Refill:  3    sacubitril-valsartan (ENTRESTO) 24 mg/26 mg tablet     Sig: Take 1 Tab by mouth two (2) times a day. Stop Lisinopril, start this new med in 2 days. Dispense:  60 Tab     Refill:  0        Plan:     Patient is a 76-VCTT-OBF male with systolic heart failure presenting [place] heart class II, Lexiscan abnormal indicating large inferior septal infarct age undetermined. Will proceed with cardiac catheterization, may necessitate cardiac MRI viability study if no intervention on catheterization. Systolic heart failure: I will discontinue lisinopril and start Entresto 24/26mg BID (Sample provided)  patient has been advised not to start this medication until Saturday morning. Will discontinue metoprolol and use carvedilol 6.25 mg twice a day, reducing Lasix from 40 mg down to 20 mg to avoid hypotension. Discussed low-sodium diet 1500 mg.   He may continue to work however he is on light duty currently. 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 Karen Bai NP    This note was created using voice recognition software. Despite editing, there may be syntax errors.

## 2018-09-13 LAB
ALBUMIN SERPL-MCNC: 4.7 G/DL (ref 3.5–5.5)
ALBUMIN/GLOB SERPL: 2.1 {RATIO} (ref 1.2–2.2)
ALP SERPL-CCNC: 72 IU/L (ref 39–117)
ALT SERPL-CCNC: 16 IU/L (ref 0–44)
AST SERPL-CCNC: 16 IU/L (ref 0–40)
BASOPHILS # BLD AUTO: 0 X10E3/UL (ref 0–0.2)
BASOPHILS NFR BLD AUTO: 1 %
BILIRUB SERPL-MCNC: 0.3 MG/DL (ref 0–1.2)
BUN SERPL-MCNC: 20 MG/DL (ref 6–24)
BUN/CREAT SERPL: 20 (ref 9–20)
CALCIUM SERPL-MCNC: 9.6 MG/DL (ref 8.7–10.2)
CHLORIDE SERPL-SCNC: 101 MMOL/L (ref 96–106)
CO2 SERPL-SCNC: 23 MMOL/L (ref 20–29)
CREAT SERPL-MCNC: 1.01 MG/DL (ref 0.76–1.27)
EOSINOPHIL # BLD AUTO: 0.3 X10E3/UL (ref 0–0.4)
EOSINOPHIL NFR BLD AUTO: 3 %
ERYTHROCYTE [DISTWIDTH] IN BLOOD BY AUTOMATED COUNT: 14.4 % (ref 12.3–15.4)
GLOBULIN SER CALC-MCNC: 2.2 G/DL (ref 1.5–4.5)
GLUCOSE SERPL-MCNC: 93 MG/DL (ref 65–99)
HCT VFR BLD AUTO: 44.2 % (ref 37.5–51)
HGB BLD-MCNC: 14.3 G/DL (ref 13–17.7)
IMM GRANULOCYTES # BLD: 0 X10E3/UL (ref 0–0.1)
IMM GRANULOCYTES NFR BLD: 0 %
INR PPP: 1 (ref 0.8–1.2)
LYMPHOCYTES # BLD AUTO: 1.4 X10E3/UL (ref 0.7–3.1)
LYMPHOCYTES NFR BLD AUTO: 16 %
MCH RBC QN AUTO: 28.8 PG (ref 26.6–33)
MCHC RBC AUTO-ENTMCNC: 32.4 G/DL (ref 31.5–35.7)
MCV RBC AUTO: 89 FL (ref 79–97)
MONOCYTES # BLD AUTO: 0.9 X10E3/UL (ref 0.1–0.9)
MONOCYTES NFR BLD AUTO: 10 %
NEUTROPHILS # BLD AUTO: 6.2 X10E3/UL (ref 1.4–7)
NEUTROPHILS NFR BLD AUTO: 70 %
PLATELET # BLD AUTO: 232 X10E3/UL (ref 150–379)
POTASSIUM SERPL-SCNC: 4.8 MMOL/L (ref 3.5–5.2)
PROT SERPL-MCNC: 6.9 G/DL (ref 6–8.5)
PROTHROMBIN TIME: 10.3 SEC (ref 9.1–12)
RBC # BLD AUTO: 4.97 X10E6/UL (ref 4.14–5.8)
SODIUM SERPL-SCNC: 141 MMOL/L (ref 134–144)
WBC # BLD AUTO: 8.9 X10E3/UL (ref 3.4–10.8)

## 2018-09-18 ENCOUNTER — HOSPITAL ENCOUNTER (OUTPATIENT)
Dept: CARDIAC CATH/INVASIVE PROCEDURES | Age: 48
Discharge: HOME | End: 2018-09-18
Attending: INTERNAL MEDICINE | Admitting: INTERNAL MEDICINE
Payer: COMMERCIAL

## 2018-09-18 VITALS
OXYGEN SATURATION: 98 % | HEART RATE: 84 BPM | RESPIRATION RATE: 16 BRPM | DIASTOLIC BLOOD PRESSURE: 69 MMHG | SYSTOLIC BLOOD PRESSURE: 99 MMHG | TEMPERATURE: 98 F

## 2018-09-18 DIAGNOSIS — I50.9 CONGESTIVE HEART FAILURE, UNSPECIFIED HF CHRONICITY, UNSPECIFIED HEART FAILURE TYPE (HCC): ICD-10-CM

## 2018-09-18 DIAGNOSIS — I42.9 CARDIOMYOPATHY, UNSPECIFIED TYPE (HCC): ICD-10-CM

## 2018-09-18 PROBLEM — Z98.890 S/P CARDIAC CATH: Status: ACTIVE | Noted: 2018-09-18

## 2018-09-18 PROCEDURE — 74011250636 HC RX REV CODE- 250/636: Performed by: INTERNAL MEDICINE

## 2018-09-18 PROCEDURE — 74011636320 HC RX REV CODE- 636/320

## 2018-09-18 PROCEDURE — 77030004549 HC CATH ANGI DX PRF MRTM -A

## 2018-09-18 PROCEDURE — 74011636320 HC RX REV CODE- 636/320: Performed by: INTERNAL MEDICINE

## 2018-09-18 PROCEDURE — 77030019569 HC BND COMPR RAD TERU -B

## 2018-09-18 PROCEDURE — 77030015766

## 2018-09-18 PROCEDURE — C1894 INTRO/SHEATH, NON-LASER: HCPCS

## 2018-09-18 PROCEDURE — 74011000250 HC RX REV CODE- 250

## 2018-09-18 PROCEDURE — C1769 GUIDE WIRE: HCPCS

## 2018-09-18 PROCEDURE — 77030010221 HC SPLNT WR POS TELE -B

## 2018-09-18 PROCEDURE — 77030019698 HC SYR ANGI MDLON MRTM -A

## 2018-09-18 PROCEDURE — 99153 MOD SED SAME PHYS/QHP EA: CPT

## 2018-09-18 PROCEDURE — 77030008543 HC TBNG MON PRSS MRTM -A

## 2018-09-18 PROCEDURE — 74011250636 HC RX REV CODE- 250/636

## 2018-09-18 RX ORDER — LIDOCAINE HYDROCHLORIDE 10 MG/ML
1-30 INJECTION, SOLUTION EPIDURAL; INFILTRATION; INTRACAUDAL; PERINEURAL
Status: DISCONTINUED | OUTPATIENT
Start: 2018-09-18 | End: 2018-09-18

## 2018-09-18 RX ORDER — VERAPAMIL HYDROCHLORIDE 2.5 MG/ML
2.5 INJECTION, SOLUTION INTRAVENOUS ONCE
Status: COMPLETED | OUTPATIENT
Start: 2018-09-18 | End: 2018-09-18

## 2018-09-18 RX ORDER — MIDAZOLAM HYDROCHLORIDE 1 MG/ML
.5-2 INJECTION, SOLUTION INTRAMUSCULAR; INTRAVENOUS
Status: DISCONTINUED | OUTPATIENT
Start: 2018-09-18 | End: 2018-09-18

## 2018-09-18 RX ORDER — VERAPAMIL HYDROCHLORIDE 2.5 MG/ML
INJECTION, SOLUTION INTRAVENOUS
Status: COMPLETED
Start: 2018-09-18 | End: 2018-09-18

## 2018-09-18 RX ORDER — FENTANYL CITRATE 50 UG/ML
25-50 INJECTION, SOLUTION INTRAMUSCULAR; INTRAVENOUS
Status: DISCONTINUED | OUTPATIENT
Start: 2018-09-18 | End: 2018-09-18

## 2018-09-18 RX ORDER — HEPARIN SODIUM 200 [USP'U]/100ML
500 INJECTION, SOLUTION INTRAVENOUS ONCE
Status: COMPLETED | OUTPATIENT
Start: 2018-09-18 | End: 2018-09-18

## 2018-09-18 RX ORDER — HEPARIN SODIUM 200 [USP'U]/100ML
INJECTION, SOLUTION INTRAVENOUS
Status: COMPLETED
Start: 2018-09-18 | End: 2018-09-18

## 2018-09-18 RX ORDER — SODIUM CHLORIDE 0.9 % (FLUSH) 0.9 %
5-10 SYRINGE (ML) INJECTION EVERY 8 HOURS
Status: DISCONTINUED | OUTPATIENT
Start: 2018-09-18 | End: 2018-09-18 | Stop reason: HOSPADM

## 2018-09-18 RX ORDER — LIDOCAINE HYDROCHLORIDE 10 MG/ML
INJECTION, SOLUTION EPIDURAL; INFILTRATION; INTRACAUDAL; PERINEURAL
Status: COMPLETED
Start: 2018-09-18 | End: 2018-09-18

## 2018-09-18 RX ORDER — HEPARIN SODIUM 1000 [USP'U]/ML
2500 INJECTION, SOLUTION INTRAVENOUS; SUBCUTANEOUS ONCE
Status: COMPLETED | OUTPATIENT
Start: 2018-09-18 | End: 2018-09-18

## 2018-09-18 RX ORDER — SODIUM CHLORIDE 0.9 % (FLUSH) 0.9 %
5-10 SYRINGE (ML) INJECTION AS NEEDED
Status: DISCONTINUED | OUTPATIENT
Start: 2018-09-18 | End: 2018-09-18 | Stop reason: HOSPADM

## 2018-09-18 RX ORDER — ACETAMINOPHEN 325 MG/1
650 TABLET ORAL
Status: DISCONTINUED | OUTPATIENT
Start: 2018-09-18 | End: 2018-09-18 | Stop reason: HOSPADM

## 2018-09-18 RX ORDER — MIDAZOLAM HYDROCHLORIDE 1 MG/ML
INJECTION, SOLUTION INTRAMUSCULAR; INTRAVENOUS
Status: COMPLETED
Start: 2018-09-18 | End: 2018-09-18

## 2018-09-18 RX ORDER — FENTANYL CITRATE 50 UG/ML
INJECTION, SOLUTION INTRAMUSCULAR; INTRAVENOUS
Status: COMPLETED
Start: 2018-09-18 | End: 2018-09-18

## 2018-09-18 RX ORDER — HEPARIN SODIUM 1000 [USP'U]/ML
INJECTION, SOLUTION INTRAVENOUS; SUBCUTANEOUS
Status: COMPLETED
Start: 2018-09-18 | End: 2018-09-18

## 2018-09-18 RX ADMIN — VERAPAMIL HYDROCHLORIDE 2.5 MG: 2.5 INJECTION, SOLUTION INTRAVENOUS at 12:47

## 2018-09-18 RX ADMIN — HEPARIN SODIUM IN SODIUM CHLORIDE 1000 UNITS: 200 INJECTION INTRAVENOUS at 12:49

## 2018-09-18 RX ADMIN — IOPAMIDOL 18 ML: 755 INJECTION, SOLUTION INTRAVENOUS at 12:53

## 2018-09-18 RX ADMIN — VERAPAMIL HYDROCHLORIDE 2.5 MG: 2.5 INJECTION INTRAVENOUS at 12:47

## 2018-09-18 RX ADMIN — MIDAZOLAM HYDROCHLORIDE 2 MG: 1 INJECTION, SOLUTION INTRAMUSCULAR; INTRAVENOUS at 12:26

## 2018-09-18 RX ADMIN — NITROGLYCERIN 200 MCG: 5 INJECTION, SOLUTION INTRAVENOUS at 12:47

## 2018-09-18 RX ADMIN — MIDAZOLAM 2 MG: 1 INJECTION INTRAMUSCULAR; INTRAVENOUS at 12:26

## 2018-09-18 RX ADMIN — HEPARIN SODIUM 2500 UNITS: 1000 INJECTION, SOLUTION INTRAVENOUS; SUBCUTANEOUS at 12:47

## 2018-09-18 RX ADMIN — LIDOCAINE HYDROCHLORIDE 2 ML: 10 INJECTION, SOLUTION EPIDURAL; INFILTRATION; INTRACAUDAL; PERINEURAL at 12:46

## 2018-09-18 RX ADMIN — IOPAMIDOL 50 ML: 755 INJECTION, SOLUTION INTRAVENOUS at 12:54

## 2018-09-18 RX ADMIN — FENTANYL CITRATE 25 MCG: 50 INJECTION, SOLUTION INTRAMUSCULAR; INTRAVENOUS at 12:26

## 2018-09-18 RX ADMIN — HEPARIN SODIUM IN SODIUM CHLORIDE 1000 UNITS: 200 INJECTION INTRAVENOUS at 12:48

## 2018-09-18 RX ADMIN — SODIUM CHLORIDE 250 ML: 900 INJECTION, SOLUTION INTRAVENOUS at 13:00

## 2018-09-18 RX ADMIN — HEPARIN SODIUM 1000 UNITS: 200 INJECTION, SOLUTION INTRAVENOUS at 12:48

## 2018-09-18 NOTE — PROCEDURES
LM-normal  LAD-normal   LCX-normal .   RCA-normal.   LV-dilated, EF 25-30%, LVEDP 16. Right radial access. No complications. EBL-minimal.   Specimen-none. Medical management.

## 2018-09-18 NOTE — PROGRESS NOTES
Patient PIV disconnected. Patient telemetry disconnected. Discharge instructions given to patient and family member. Discharge medications reviewed with patient and appropriate educational materials and side effects teaching were provided. Post procedure instructions given for right radial cath site. Patient discharged with Student Nurse and CCU RN via ambulation to the patient lot. Discharged home with family member via personal vehicle.

## 2018-09-18 NOTE — PROGRESS NOTES
Amrita Galindo is recovering post-procedure. R radial site dressing is CDI without swelling or bleeding with TR band in place. Patient with low BP, but asymptomatic. Low BP not unexpected with his medications and low EF. Rhythm sinus. Amrita Galindo denies complaints at this time. If recovery continues to progress without complication, discharge is planned for later today. I discussed a Lifevest with the patient and he refuses at this time. He would like to think about it longer at home and address it at his follow up appointment with Dr. Jere De La Vega.              Anthony Eagle, JOHANNA  DNP, RN, AGACNP-BC

## 2018-09-18 NOTE — INTERVAL H&P NOTE
H&P Update:  Steve Malloy was seen and examined. History and physical has been reviewed. The patient has been examined.  There have been no significant clinical changes since the completion of the originally dated History and Physical.    Signed By: Faviola Hoffman MD     September 18, 2018 1:19 PM

## 2018-09-18 NOTE — H&P (VIEW-ONLY)
Zuly Myrick DNP, ANP-BC Subjective/HPI:  
 
Lizet Zhang is a 46 y.o. male is here for follow-up from abnormal nuclear stress test indicating ejection fraction 21% confirming echo reading with large inferior septal infarct no ischemia. Patient denies lightheadedness or dizziness, does admit to fatigue and generalized weakness. Findings: The overall quality of the study is excellent. Attenuation artifact was Noted. Left ventricular cavity is noted to be severely dilated on the rest and stress studies. Additionally, the right ventricle is normal.  
 
SPECT images demonstrate a large, fixed abnormality of severe degree in the inferior and septal region   on the stress and rest images. Gated SPECT images demonstrates dyskinesis of the inferior, septal and apical region. The left ventricular ejection fraction was calculated to be 21 %. Impression:  
Myocardial perfusion imaging is abnormal: there is a large area of infarction in the infero-septal wall. Overall left ventricular systolic function was abnormal: with regional wall motion abnormalities (as noted above). These test results indicate high likelihood for the presence of angiographically significant coronary artery disease PCP Provider Natty Abbasi MD 
Past Medical History:  
Diagnosis Date  CAD (coronary artery disease) 08/2018  
 abnormal stress tests  CHF (congestive heart failure) ([facility] 07/2018 History reviewed. No pertinent surgical history. No Known Allergies History reviewed. No pertinent family history. Current Outpatient Prescriptions Medication Sig  furosemide (LASIX) 20 mg tablet Take 1 Tab by mouth daily. Reduced 9/12/18  carvedilol (COREG) 6.25 mg tablet Take 1 Tab by mouth two (2) times daily (with meals). D/C metoprolol  sacubitril-valsartan (ENTRESTO) 24 mg/26 mg tablet Take 1 Tab by mouth two (2) times a day. Stop Lisinopril, start this new med in 2 days.  potassium 99 mg tablet Take 99 mg by mouth daily.  aspirin (ASPIR-81 PO) Take 81 mg by mouth daily. No current facility-administered medications for this visit. Vitals:  
 09/12/18 1341 09/12/18 1346 BP: 112/70 102/62 Pulse: (!) 102 Resp: 16 SpO2: 98% Weight: 228 lb 9.6 oz (103.7 kg) Height: 5' 11\" (1.803 m) Social History Social History  Marital status: SINGLE Spouse name: N/A  
 Number of children: N/A  
 Years of education: N/A Occupational History  Not on file. Social History Main Topics  Smoking status: Former Smoker Packs/day: 1.00 Years: 35.00 Types: Cigarettes Quit date: 2/19/2018  Smokeless tobacco: Current User Types: Chew  Alcohol use Yes Comment: on occassion  Drug use: No  
 Sexual activity: Yes Other Topics Concern  Not on file Social History Narrative I have reviewed the nurses notes, vitals, problem list, allergy list, medical history, family, social history and medications. Review of Symptoms: 
 
General: Pt denies excessive weight gain or loss. Pt is able to conduct ADL's HEENT: Denies blurred vision, headaches, epistaxis and difficulty swallowing. Respiratory: Denies shortness of breath, HERRERA, wheezing or stridor. Cardiovascular: Denies precordial pain, palpitations, edema or PND Gastrointestinal: Denies poor appetite, indigestion, abdominal pain or blood in stool Musculoskeletal: Denies pain or swelling from muscles or joints Neurologic: Denies tremor, paresthesias, or sensory motor disturbance Skin: Denies rash, itching or texture change. Physical Exam:   
 
General: Well developed, in no acute distress, cooperative and alert HEENT: No carotid bruits, no JVD, trach is midline. Neck Supple, PEERL, EOM intact. Heart:  Normal S1/S2 negative S3 or S4. Regular, no murmur, gallop or rub.  
Respiratory: Clear bilaterally x 4, no wheezing or rales Abdomen:   Soft, non-tender, no masses, bowel sounds are active.  
Extremities:  No edema, normal cap refill, no cyanosis, atraumatic. Neuro: A&Ox3, speech clear, gait stable. Skin: Skin color is normal. No rashes or lesions. Non diaphoretic Vascular: 2+ pulses symmetric in all extremities Cardiographics ECG: Left bundle branch block No results found for this or any previous visit. Cardiology Labs: 
Lab Results Component Value Date/Time Cholesterol, total 181 07/05/2018 08:49 AM  
 HDL Cholesterol 51 07/05/2018 08:49 AM  
 LDL, calculated 124 (H) 07/05/2018 08:49 AM  
 Triglyceride 31 07/05/2018 08:49 AM  
 
 
Lab Results Component Value Date/Time Sodium 143 07/05/2018 08:49 AM  
 Potassium 4.8 07/05/2018 08:49 AM  
 Chloride 105 07/05/2018 08:49 AM  
 CO2 17 (L) 07/05/2018 08:49 AM  
 Glucose 105 (H) 07/05/2018 08:49 AM  
 BUN 13 07/05/2018 08:49 AM  
 Creatinine 1.18 07/05/2018 08:49 AM  
 BUN/Creatinine ratio 11 07/05/2018 08:49 AM  
 GFR est AA 83 07/05/2018 08:49 AM  
 GFR est non-AA 72 07/05/2018 08:49 AM  
 Calcium 9.7 07/05/2018 08:49 AM  
  
 
 
 Assessment: 
 
 Assessment:  
 
Diagnoses and all orders for this visit: 1. Cardiomyopathy, unspecified type ([facility] -     METABOLIC PANEL, COMPREHENSIVE 
-     CBC WITH AUTOMATED DIFF 
-     PROTHROMBIN TIME + INR 
-     CARDIAC CATHETERIZATION; Future 2. Congestive heart failure, unspecified HF chronicity, unspecified heart failure type ([facility] -     furosemide (LASIX) 20 mg tablet; Take 1 Tab by mouth daily. Reduced 2/63/65 
-     METABOLIC PANEL, COMPREHENSIVE 
-     CBC WITH AUTOMATED DIFF 
-     PROTHROMBIN TIME + INR 
-     CARDIAC CATHETERIZATION; Future 3. Myocardial infarct, old Other orders -     carvedilol (COREG) 6.25 mg tablet; Take 1 Tab by mouth two (2) times daily (with meals). D/C metoprolol 
-     sacubitril-valsartan (ENTRESTO) 24 mg/26 mg tablet;  Take 1 Tab by mouth two (2) times a day. Stop Lisinopril, start this new med in 2 days. ICD-10-CM ICD-9-CM 1. Cardiomyopathy, unspecified type ([facility] T12.5 070.5 METABOLIC PANEL, COMPREHENSIVE  
   CBC WITH AUTOMATED DIFF PROTHROMBIN TIME + INR  
   CARDIAC CATHETERIZATION 2. Congestive heart failure, unspecified HF chronicity, unspecified heart failure type (HCC) I50.9 428.0 furosemide (LASIX) 20 mg tablet METABOLIC PANEL, COMPREHENSIVE  
   CBC WITH AUTOMATED DIFF PROTHROMBIN TIME + INR  
   CARDIAC CATHETERIZATION 3. Myocardial infarct, old I25.2 12 Orders Placed This Encounter  METABOLIC PANEL, COMPREHENSIVE  
 CBC WITH AUTOMATED DIFF  
 PROTHROMBIN TIME + INR  CARDIAC CATHETERIZATION Standing Status:   Future Standing Expiration Date:   3/12/2019 Order Specific Question:   Reason for Exam: Answer:   CHF  furosemide (LASIX) 20 mg tablet Sig: Take 1 Tab by mouth daily. Reduced 9/12/18 Dispense:  30 Tab Refill:  3  carvedilol (COREG) 6.25 mg tablet Sig: Take 1 Tab by mouth two (2) times daily (with meals). D/C metoprolol Dispense:  60 Tab Refill:  3  
 sacubitril-valsartan (ENTRESTO) 24 mg/26 mg tablet Sig: Take 1 Tab by mouth two (2) times a day. Stop Lisinopril, start this new med in 2 days. Dispense:  60 Tab Refill:  0 Plan:  
 
Patient is a 88-[employer] male with systolic heart failure presenting [place] heart class II, Lexiscan abnormal indicating large inferior septal infarct age undetermined. Will proceed with cardiac catheterization, may necessitate cardiac MRI viability study if no intervention on catheterization. Systolic heart failure: I will discontinue lisinopril and start Entresto 24/26mg BID (Sample provided)  patient has been advised not to start this medication until Saturday morning.   Will discontinue metoprolol and use carvedilol 6.25 mg twice a day, reducing Lasix from 40 mg down to 20 mg to avoid hypotension. Discussed low-sodium diet 1500 mg. He may continue to work however he is on light duty currently. 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 Larence Heimlich, NP This note was created using voice recognition software. Despite editing, there may be syntax errors.

## 2018-09-18 NOTE — PROGRESS NOTES
Patient ambulated in hallway with Student Nurse. Patient walked with steady gait. Patient reported no problems. Patient in bed waiting for discharge instructions. Family member at bedside.

## 2018-09-18 NOTE — IP AVS SNAPSHOT
[address] [facility] 
[phone] Patient: Malia Villegas MRN: [id number] About your hospitalization You were admitted on:  September 18, 2018 You last received care in the:  [facility] CARDIO You were discharged on:  September 18, 2018 Why you were hospitalized Your primary diagnosis was:  Not on File Your diagnoses also included:  S/P Cardiac Cath Follow-up Information Follow up With Details Comments Contact Info [address], [place] Go on 10/3/2018 11:15AM for post-cath follow up  [address] [facility] 
[phone] Meron Max MD   [address] [address] 
[phone] Your Scheduled Appointments Wednesday October 03, 2018 11:15 AM EDT  
ESTABLISHED PATIENT with Elvis Carballo MD  
[facility] [address] [facility]) [address] [facility]  
[phone] Discharge Orders None A check hao indicates which time of day the medication should be taken. My Medications CONTINUE taking these medications Instructions Each Dose to Equal  
 Morning Noon Evening Bedtime ASPIR-81 PO Your last dose was: Your next dose is: Take 81 mg by mouth daily. 81 mg  
    
   
   
   
  
 carvedilol 6.25 mg tablet Commonly known as:  Dylonamco Salazar Your last dose was: Your next dose is: Take 1 Tab by mouth two (2) times daily (with meals). D/C metoprolol 6.25 mg  
    
   
   
   
  
 furosemide 20 mg tablet Commonly known as:  LASIX Your last dose was: Your next dose is: Take 1 Tab by mouth daily. Reduced 9/12/18  
 20 mg  
    
   
   
   
  
 potassium 99 mg tablet Your last dose was: Your next dose is: Take 99 mg by mouth daily. 99 mg  
    
   
   
   
  
 sacubitril-valsartan 24-26 mg tablet Commonly known as:  ENTRESTO Your last dose was: Your next dose is: Take 1 Tab by mouth two (2) times a day. Stop Lisinopril, start this new med in 2 days. 1 Tab Discharge Instructions [address] [address]  [phone] Patient ID: 
Yuli Middleton 
[id number] 
31 y.o. 
1970 Admit Date: 9/18/2018 Discharge Date: 9/18/2018 Admitting Physician: Trudi Salcedo MD  
 
Discharge Physician: Lalitha Amato NP Admission Diagnoses:  
Cardiomyopathy, unspecified type ([address] [I42.9] Congestive heart failure, unspecified HF chronicity, unspecified heart failure type ([address] [I50.9] Discharge Diagnoses: Active Problems: S/P cardiac cath (9/18/2018) Overview: 9/18/18: no significant CAD Discharge Condition: Good Cardiology Procedures this Admission:  Diagnostic left heart catheterization Disposition: home Reference discharge instructions provided by nursing for diet and activity. Signed: 
Lalitha Amato NP 
9/18/2018 
3:06 PM 
 
 
Radial Cardiac Catheterization/Angiography Discharge Instructions It is normal to feel tired the first couple days. Take it easy and follow the physicians instructions. CHECK THE CATHETER INSERTION SITE DAILY: 
 
Remove the wrist dressing 24 hours after the procedure. You may shower 24 hours after the procedure. Wash with soap and water and pat dry. Gentle cleaning of the site with soap and water is sufficient, cover with a dry clean dressing or bandage. Do not apply creams or powders to the area. No soaking the wrist for 3 days. Leave the puncture site open to air after 24 hours post-procedure. CALL THE PHYSICIANS:  
 
If the site becomes red, swollen or feels warm to the touch If there is bleeding or drainage or if there is unusual pain at the radial site. If there is any minor oozing, you may apply a band-aid and remove after 12 hours. If the bleeding continues, hold pressure with the middle finger against the puncture site and the thumb against the back of the wrist,call 911 to be transported to the hospital. 
DO NOT DRIVE YOURSELF, [address] ACTIVITY:  
For the first 24 hours do not manipulate the wrist. 
No lifting, pushing or pulling over 3-5 pounds with the affected wrist for 7 daysand no straining the insertion site. Do not life grocery bags or the garbage can, do not run the vacuum  or  for 7 days. Start with short walks as in the hospital and gradually increase as tolerated each day. It is recommended to walk 30 minutes 5-7 days per week. Follow your physicians instructions on activity. Avoid walking outside in extremes of heat or cold. Walk inside when it is cold and windy or hot and humid. Things to keep in mind: 
No driving for at least 24 hours, or as designated by your physician. Limit the number of times you go up and down the stairs Take rests and pace yourself with activity. Be careful and do not strain with bowel movements. MEDICATIONS: 
 
Take all medications as prescribed Call your physician if you have any questions Keep an updated list of your medications with you at all times and give a list to your physician and pharmacist 
 
SIGNS AND SYMPTOMS:  
Be cautious of symptoms of angina or recurrent symptoms such as chest discomfort, unusual shortness of breath or fatigue. These could be symptoms of restenosis, a new blockage or a heart attack. If your symptoms are relieved with rest it is still recommended that you notify your physician of recurrent chest pain or discomfort.  
For CHEST PAIN or symptoms of angina not relieved with rest:  If the discomfort is not relieved with rest, and you have been prescribed Nitroglycerin, take as directed (taken under the tongue, one at a time 5 minutes apart for a total of 3 doses). If the discomfort is not relieved after the 3rd nitroglycerin, call 911. If you have not been prescribed Nitroglycerin  and your chest discomfort is not relieved with rest, call 911. AFTER CARE:  
Follow up with your physician as instructed. Follow a heart healthy diet with proper portion control, daily stress management, daily exercise, blood pressure and cholesterol control , and smoking cessation. Introducing [facility]! Jonny Cox introduces [employer] patient portal. Now you can access parts of your medical record, email your doctor's office, and request medication refills online. 1. In your internet browser, go to [email or web address] 2. Click on the First Time User? Click Here link in the Sign In box. You will see the New Member Sign Up page. 3. Enter your [employer] Access Code exactly as it appears below. You will not need to use this code after youve completed the sign-up process. If you do not sign up before the expiration date, you must request a new code. · [employer] Access Code: [email or web address] Expires: 10/3/2018  8:17 AM 
 
4. Enter the last four digits of your Social Security Number (xxxx) and Date of Birth (mm/dd/yyyy) as indicated and click Submit. You will be taken to the next sign-up page. 5. Create a [employer] ID. This will be your [employer] login ID and cannot be changed, so think of one that is secure and easy to remember. 6. Create a [employer] password. You can change your password at any time. 7. Enter your Password Reset Question and Answer. This can be used at a later time if you forget your password. 8. Enter your e-mail address. You will receive e-mail notification when new information is available in [address] 9. Click Sign Up. You can now view and download portions of your medical record. 10. Click the Download Summary menu link to download a portable copy of your medical information. If you have questions, please visit the Frequently Asked Questions section of the [employer] website. Remember, [employer] is NOT to be used for urgent needs. For medical emergencies, dial 911. Now available from your iPhone and Android! Introducing Robert Taylor As a [employer] patient, I wanted to make you aware of our electronic visit tool called Robert Taylor. [employer] 24/7 allows you to connect within minutes with a medical provider 24 hours a day, seven days a week via a mobile device or tablet or logging into a secure website from your computer. You can access Robert Taylor from anywhere in the [place]. A virtual visit might be right for you when you have a simple condition and feel like you just dont want to get out of bed, or cant get away from work for an appointment, when your regular [employer] provider is not available (evenings, weekends or holidays), or when youre out of town and need minor care. Electronic visits cost only $49 and if the [employer] 24/7 provider determines a prescription is needed to treat your condition, one can be electronically transmitted to a nearby pharmacy*. Please take a moment to enroll today if you have not already done so. The enrollment process is free and takes just a few minutes. To enroll, please download the [employer] 24/7 ruthy to your tablet or phone, or visit [email or web address] to enroll on your computer. And, as an [address] [place] [address] patient with a [employer] account, the results of your visits will be scanned into your electronic medical record and your primary care provider will be able to view the scanned results.    
We urge you to continue to see your regular [employer] provider for your ongoing medical care. And while your primary care provider may not be the one available when you seek a Robert Taylor virtual visit, the peace of mind you get from getting a real diagnosis real time can be priceless. For more information on Robert Taylor, view our Frequently Asked Questions (FAQs) at [email or web address]. Sincerely, 
 
Teto Mckinney MD 
Chief Medical Officer Jaycee Tracy *:  certain medications cannot be prescribed via Robert Taylor Providers Seen During Your Hospitalization Provider Specialty Primary office phone Ranjit Contreras MD Cardiology [phone] Your Primary Care Physician (PCP) Primary Care Physician Office Phone Office Fax [facility], Aminah Garzon [phone] You are allergic to the following No active allergies Recent Documentation Smoking Status Former Smoker Emergency Contacts Name Discharge Info Relation Home Work Mobile Lizzy Flynn  Mother [14] [phone] GeeRoxy DISCHARGE CAREGIVER [3] Other Relative [6]   [phone] Patient Belongings The following personal items are in your possession at time of discharge: 
  Dental Appliances: None, Uppers, Lowers  Visual Aid: None      Home Medications: None   Jewelry: None  Clothing: At bedside, Footwear, Pants, Shirt, Socks, Undergarments    Other Valuables: [employer] Please provide this summary of care documentation to your next provider. Signatures-by signing, you are acknowledging that this After Visit Summary has been reviewed with you and you have received a copy. Patient Signature:  ____________________________________________________________ Date:  ____________________________________________________________  
  
Naeem Frias Provider Signature:  ____________________________________________________________ Date:  ____________________________________________________________

## 2018-09-18 NOTE — PROGRESS NOTES
9/18/2018      RE: Delmi Hughes      To Whom it May Concern: This is to certify that Delmi Hughes may may return to work on 9/24/18. Please feel free to contact my office if you have any questions or concerns. Thank you for your assistance in this matter.     Sincerely,        Socorro Lynn NP       [address] [facility]    [phone]

## 2018-10-03 ENCOUNTER — OFFICE VISIT (OUTPATIENT)
Dept: CARDIOLOGY CLINIC | Age: 48
End: 2018-10-03

## 2018-10-03 ENCOUNTER — DOCUMENTATION ONLY (OUTPATIENT)
Dept: CARDIOLOGY CLINIC | Age: 48
End: 2018-10-03

## 2018-10-03 VITALS
HEART RATE: 92 BPM | HEIGHT: 71 IN | DIASTOLIC BLOOD PRESSURE: 66 MMHG | BODY MASS INDEX: 32.41 KG/M2 | RESPIRATION RATE: 18 BRPM | OXYGEN SATURATION: 96 % | WEIGHT: 231.5 LBS | SYSTOLIC BLOOD PRESSURE: 106 MMHG

## 2018-10-03 DIAGNOSIS — I50.9 CONGESTIVE HEART FAILURE, UNSPECIFIED HF CHRONICITY, UNSPECIFIED HEART FAILURE TYPE (HCC): ICD-10-CM

## 2018-10-03 DIAGNOSIS — I42.9 CARDIOMYOPATHY, UNSPECIFIED TYPE (HCC): Primary | ICD-10-CM

## 2018-10-03 RX ORDER — FUROSEMIDE 40 MG/1
40 TABLET ORAL DAILY
Qty: 30 TAB | Refills: 3 | Status: SHIPPED | OUTPATIENT
Start: 2018-10-03 | End: 2019-03-16 | Stop reason: SDUPTHER

## 2018-10-03 RX ORDER — METOPROLOL SUCCINATE 50 MG/1
50 TABLET, EXTENDED RELEASE ORAL DAILY
COMMUNITY
Start: 2018-08-31 | End: 2018-10-03 | Stop reason: ALTCHOICE

## 2018-10-03 NOTE — PATIENT INSTRUCTIONS

## 2018-10-03 NOTE — PROGRESS NOTES
1. Have you been to the ER, urgent care clinic since your last visit? Hospitalized since your last visit? [facility].    2. Have you seen or consulted any other health care providers outside of the [address] [place] [address] since your last visit? Include any pap smears or colon screening. NO    HOSPITAL FOLLOW UP. C/O SLIGHT SWELLING IN BLE.

## 2018-10-03 NOTE — PROGRESS NOTES
Shani Ozuna DNP, ANP-BC  Subjective/HPI:     Layla Regalado is a 46 y.o. male is here for hospital follow-up from coronary angiography. Normal coronary arteries, ejection fraction 25% on cath. Patient reports some increasing dependent edema and weight gain with reduction of furosemide when Entresto was started. Reports mild dyspnea on exertion denies chest pain. Has pain or discomfort from right radial cath site    PCP Provider  Artem Wadsworth MD  Past Medical History:   Diagnosis Date    CAD (coronary artery disease) 08/2018    abnormal stress tests    CHF (congestive heart failure) ([facility] [address] 07/2018      No past surgical history on file. No Known Allergies   No family history on file. Current Outpatient Prescriptions   Medication Sig    furosemide (LASIX) 40 mg tablet Take 1 Tab by mouth daily.  carvedilol (COREG) 6.25 mg tablet Take 1 Tab by mouth two (2) times daily (with meals). D/C metoprolol    sacubitril-valsartan (ENTRESTO) 24 mg/26 mg tablet Take 1 Tab by mouth two (2) times a day. Stop Lisinopril, start this new med in 2 days.  potassium 99 mg tablet Take 99 mg by mouth daily.  aspirin (ASPIR-81 PO) Take 81 mg by mouth daily. No current facility-administered medications for this visit. Vitals:    10/03/18 1426 10/03/18 1433   BP: 110/70 106/66   Pulse: 92    Resp: 18    SpO2: 96%    Weight: 231 lb 8 oz (105 kg)    Height: 5' 11\" (1.803 m)      Social History     Social History    Marital status: SINGLE     Spouse name: N/A    Number of children: N/A    Years of education: N/A     Occupational History    Not on file.      Social History Main Topics    Smoking status: Former Smoker     Packs/day: 1.00     Years: 35.00     Types: Cigarettes     Quit date: 2/19/2018    Smokeless tobacco: Current User     Types: Chew    Alcohol use Yes      Comment: on occassion    Drug use: No    Sexual activity: Yes     Other Topics Concern    Not on file     Social History Narrative       I have reviewed the nurses notes, vitals, problem list, allergy list, medical history, family, social history and medications. Review of Symptoms:    General: Pt denies excessive weight gain or loss. Pt is able to conduct ADL's  HEENT: Denies blurred vision, headaches, epistaxis and difficulty swallowing. Respiratory: Denies shortness of breath, HERRERA, wheezing or stridor. Cardiovascular: Denies precordial pain, palpitations, edema or PND  Gastrointestinal: Denies poor appetite, indigestion, abdominal pain or blood in stool  Musculoskeletal: Denies pain or swelling from muscles or joints  Neurologic: Denies tremor, paresthesias, or sensory motor disturbance  Skin: Denies rash, itching or texture change. Physical Exam:      General: Well developed, in no acute distress, cooperative and alert  HEENT: No carotid bruits, no JVD, trach is midline. Neck Supple, PEERL, EOM intact. Heart:  Normal S1/S2 negative S3 or S4. Regular, no murmur, gallop or rub.   Respiratory: Clear bilaterally x 4, no wheezing or rales  Abdomen:   Soft, non-tender, no masses, bowel sounds are active.   Extremities:  No edema, normal cap refill, no cyanosis, atraumatic. Neuro: A&Ox3, speech clear, gait stable. Skin: Skin color is normal. No rashes or lesions. Non diaphoretic  Vascular: 2+ pulses symmetric in all extremities. Right radial cath site normal    Cardiographics    ECG: Sinus  No results found for this or any previous visit.       Cardiology Labs:  Lab Results   Component Value Date/Time    Cholesterol, total 181 07/05/2018 08:49 AM    HDL Cholesterol 51 07/05/2018 08:49 AM    LDL, calculated 124 (H) 07/05/2018 08:49 AM    Triglyceride 31 07/05/2018 08:49 AM       Lab Results   Component Value Date/Time    Sodium 141 09/12/2018 03:14 PM    Potassium 4.8 09/12/2018 03:14 PM    Chloride 101 09/12/2018 03:14 PM    CO2 23 09/12/2018 03:14 PM    Glucose 93 09/12/2018 03:14 PM    BUN 20 09/12/2018 03:14 PM Creatinine 1.01 09/12/2018 03:14 PM    BUN/Creatinine ratio 20 09/12/2018 03:14 PM    GFR est  09/12/2018 03:14 PM    GFR est non-AA 86 09/12/2018 03:14 PM    Calcium 9.6 09/12/2018 03:14 PM    Bilirubin, total 0.3 09/12/2018 03:14 PM    AST (SGOT) 16 09/12/2018 03:14 PM    Alk. phosphatase 72 09/12/2018 03:14 PM    Protein, total 6.9 09/12/2018 03:14 PM    Albumin 4.7 09/12/2018 03:14 PM    A-G Ratio 2.1 09/12/2018 03:14 PM    ALT (SGPT) 16 09/12/2018 03:14 PM           Assessment:     Assessment:     Diagnoses and all orders for this visit:    1. Cardiomyopathy, unspecified type ([facility]  -     AMB POC EKG ROUTINE W/ 12 LEADS, INTER & REP  -     2D ECHO LIMTED ADULT W OR WO CONTR    2. Congestive heart failure, unspecified HF chronicity, unspecified heart failure type (HCC)  -     furosemide (LASIX) 40 mg tablet; Take 1 Tab by mouth daily. -     2D ECHO [address] CONTR        ICD-10-CM ICD-9-CM    1. Cardiomyopathy, unspecified type ([facility] I42.9 425.4 AMB POC EKG ROUTINE W/ 12 LEADS, INTER & REP      2D ECHO LIMTED ADULT W OR WO CONTR   2. Congestive heart failure, unspecified HF chronicity, unspecified heart failure type (HCC) I50.9 428.0 furosemide (LASIX) 40 mg tablet      2D ECHO LIMTED ADULT W OR WO CONTR     Orders Placed This Encounter    AMB POC EKG ROUTINE W/ 12 LEADS, INTER & REP     Order Specific Question:   Reason for Exam:     Answer:   ROUTINE    2D ECHO LIMTED ADULT W OR WO CONTR     Order Specific Question:   Reason for Exam:     Answer:   CHF     Order Specific Question:   Contrast Enhancement (Bubble Study, Definity, Optison) may be used if criteria listed in established evidence-based protocol has been identified. Answer: Yes    DISCONTD: metoprolol succinate (TOPROL-XL) 50 mg XL tablet     Sig: Take 50 mg by mouth daily.  furosemide (LASIX) 40 mg tablet     Sig: Take 1 Tab by mouth daily.      Dispense:  30 Tab     Refill:  3        Plan:     Patient presents with compensated systolic heart failure ejection fraction 25% normal coronary arteries. [place] heart class II. Continue Entresto carvedilol, will increase furosemide back to 40 mg.  Limited echocardiogram to be done in the next 2-3 weeks was will be a full 3 months of heart failure therapy. If ejection fraction is less than 35% he will need ICD placement    Emil Caban NP    This note was created using voice recognition software. Despite editing, there may be syntax errors.

## 2018-10-03 NOTE — MR AVS SNAPSHOT
[address] [facility] 
[phone] Patient: Dari Macdonald MRN: [id number] [facility]:[birth year] Visit Information Date & Time Provider Department Dept. Phone Encounter #  
 10/3/2018  2:45 PM Yoon Gupta NP [facility] [phone] Follow-up Instructions Return in about 2 weeks (around 10/17/2018). Routing History Follow-up and Disposition History Your Appointments 10/11/2018  5:20 PM  
Any with Amberly Hubbard MD  
[address] [facility]) Appt Note: f/u heart cath $25 CP mbm; f.up heart cath cp$25 reschedule 9/28/18 e  
 [address] [phone]  
[phone]  
  
   
 [address] [facility] [phone]  
  
    
 10/15/2018  2:30 PM  
ECHO CARDIOGRAMS 2D with [address] [facility]) Appt Note: Per Brain2D ECHO LIMTED ADULT W OR WO CONTR [[id number]] (Order [id number]), see Dr. Carlos PUENTES 25 [facility]  
[phone] [address]  
  
    
 10/15/2018  3:30 PM  
ESTABLISHED PATIENT with [address] Carballo MD  
[facility]) Appt Note: Per Brain2D ECHO LIMTED ADULT W OR WO CONTR [[id number]] (Order [id number]), see Dr. Carlos PUENTES 25 [facility]  
[phone] [address] Upcoming Health Maintenance Date Due Pneumococcal 19-64 Medium Risk (1 of 1 - PPSV23) 2/25/1987 DTaP/Tdap/Td series (1 - Tdap) 2/25/1989 Shingrix Vaccine Age 50> (1 of 2) 2/25/2018 FOBT Q 1 YEAR AGE 50-75 2/25/2018 Influenza Age 5 to Adult 8/1/2018 Allergies as of 10/3/2018  Review Complete On: 10/3/2018 By: Joon Howe LPN  
 No Known Allergies Current Immunizations  Never Reviewed No immunizations on file. Not reviewed this visit You Were Diagnosed With   
  
 Codes Comments Cardiomyopathy, unspecified type ([facility]    -  Primary ICD-10-CM: I42.9 ICD-9-CM: 425.4 Congestive heart failure, unspecified HF chronicity, unspecified heart failure type ([facility]     ICD-10-CM: I50.9 ICD-9-CM: 428.0 Vitals BP Pulse Resp Height(growth percentile) Weight(growth percentile) SpO2  
 106/66 (BP 1 Location: Right arm, BP Patient Position: Sitting) 92 18 5' 11\" (1.803 m) 231 lb 8 oz (105 kg) 96% BMI Smoking Status 32.29 kg/m2 Former Smoker Vitals History BMI and BSA Data Body Mass Index Body Surface Area  
 32.29 kg/m 2 2.29 m 2 Preferred Pharmacy Pharmacy Name Phone [address] [place] [address],[address] [place] [address]  [phone] Your Updated Medication List  
  
   
This list is accurate as of 10/3/18  3:19 PM.  Always use your most recent med list.  
  
  
  
  
 ASPIR-81 PO Take 81 mg by mouth daily. carvedilol 6.25 mg tablet Commonly known as:  Joycgt Lakeisha Take 1 Tab by mouth two (2) times daily (with meals). D/C metoprolol  
  
 furosemide 40 mg tablet Commonly known as:  LASIX Take 1 Tab by mouth daily. potassium 99 mg tablet Take 99 mg by mouth daily. sacubitril-valsartan 24-26 mg tablet Commonly known as:  ENTRESTO Take 1 Tab by mouth two (2) times a day. Prescriptions Sent to Pharmacy Refills  
 furosemide (LASIX) 40 mg tablet 3 Sig: Take 1 Tab by mouth daily. Class: Normal  
 Pharmacy: [address] [place] [address] Dr Ph #: [phone] Route: Oral  
 sacubitril-valsartan (ENTRESTO) 24 mg/26 mg tablet 1 Sig: Take 1 Tab by mouth two (2) times a day. Class: Normal  
 Pharmacy: [address] [place] [address],[address] [place] [address]  Ph #: [phone]  Route: Oral  
  
 We Performed the Following 2D ECHO LIMTED ADULT W OR WO CONTR [94353 CPT(R)] AMB POC EKG ROUTINE W/ 12 LEADS, INTER & REP [03753 CPT(R)] Follow-up Instructions Return in about 2 weeks (around 10/17/2018). Patient Instructions DASH Diet: Care Instructions Your Care Instructions The DASH diet is an eating plan that can help lower your blood pressure. DASH stands for Dietary Approaches to Stop Hypertension. Hypertension is high blood pressure. The DASH diet focuses on eating foods that are high in calcium, potassium, and magnesium. These nutrients can lower blood pressure. The foods that are highest in these nutrients are fruits, vegetables, low-fat dairy products, nuts, seeds, and legumes. But taking calcium, potassium, and magnesium supplements instead of eating foods that are high in those nutrients does not have the same effect. The DASH diet also includes whole grains, fish, and poultry. The DASH diet is one of several lifestyle changes your doctor may recommend to lower your high blood pressure. Your doctor may also want you to decrease the amount of sodium in your diet. Lowering sodium while following the DASH diet can lower blood pressure even further than just the DASH diet alone. Follow-up care is a key part of your treatment and safety. Be sure to make and go to all appointments, and call your doctor if you are having problems. It's also a good idea to know your test results and keep a list of the medicines you take. How can you care for yourself at home? Following the DASH diet · Eat 4 to 5 servings of fruit each day. A serving is 1 medium-sized piece of fruit, ½ cup chopped or canned fruit, 1/4 cup dried fruit, or 4 ounces (½ cup) of fruit juice. Choose fruit more often than fruit juice. · Eat 4 to 5 servings of vegetables each day.  A serving is 1 cup of lettuce or raw leafy vegetables, ½ cup of chopped or cooked vegetables, or 4 ounces (½ cup) of vegetable juice. Choose vegetables more often than vegetable juice. · Get 2 to 3 servings of low-fat and fat-free dairy each day. A serving is 8 ounces of milk, 1 cup of yogurt, or 1 ½ ounces of cheese. · Eat 6 to 8 servings of grains each day. A serving is 1 slice of bread, 1 ounce of dry cereal, or ½ cup of cooked rice, pasta, or cooked cereal. Try to choose whole-grain products as much as possible. · Limit lean meat, poultry, and fish to 2 servings each day. A serving is 3 ounces, about the size of a deck of cards. · Eat 4 to 5 servings of nuts, seeds, and legumes (cooked dried beans, lentils, and split peas) each week. A serving is 1/3 cup of nuts, 2 tablespoons of seeds, or ½ cup of cooked beans or peas. · Limit fats and oils to 2 to 3 servings each day. A serving is 1 teaspoon of vegetable oil or 2 tablespoons of salad dressing. · Limit sweets and added sugars to 5 servings or less a week. A serving is 1 tablespoon jelly or jam, ½ cup sorbet, or 1 cup of lemonade. · Eat less than 2,300 milligrams (mg) of sodium a day. If you limit your sodium to 1,500 mg a day, you can lower your blood pressure even more. Tips for success · Start small. Do not try to make dramatic changes to your diet all at once. You might feel that you are missing out on your favorite foods and then be more likely to not follow the plan. Make small changes, and stick with them. Once those changes become habit, add a few more changes. · Try some of the following: ¨ Make it a goal to eat a fruit or vegetable at every meal and at snacks. This will make it easy to get the recommended amount of fruits and vegetables each day. ¨ Try yogurt topped with fruit and nuts for a snack or healthy dessert. ¨ Add lettuce, tomato, cucumber, and onion to sandwiches. ¨ Combine a ready-made pizza crust with low-fat mozzarella cheese and lots of vegetable toppings.  Try using tomatoes, squash, spinach, broccoli, carrots, cauliflower, and onions. ¨ Have a variety of cut-up vegetables with a low-fat dip as an appetizer instead of chips and dip. ¨ Sprinkle sunflower seeds or chopped almonds over salads. Or try adding chopped walnuts or almonds to cooked vegetables. ¨ Try some vegetarian meals using beans and peas. Add garbanzo or kidney beans to salads. Make burritos and tacos with mashed hutton beans or black beans. Where can you learn more? Go to http://melany-andrew.info/. Enter C274 in the search box to learn more about \"DASH Diet: Care Instructions. \" Current as of: December 6, 2017 Content Version: 11.8 © 3868-9293 [employer]. Care instructions adapted under license by [employer] (which disclaims liability or warranty for this information). If you have questions about a medical condition or this instruction, always ask your healthcare professional. [place] [address] any warranty or liability for your use of this information. Introducing [facility]! Dear Ton Ashley: Thank you for requesting a [facility] account. Our records indicate that you already have an active [employer] account. You can access your account anytime at [email or web address] Did you know that you can access your hospital and ER discharge instructions at any time in [employer]? You can also review all of your test results from your hospital stay or ER visit. Additional Information If you have questions, please visit the Frequently Asked Questions section of the [employer] website at [email or web address] Remember, [employer] is NOT to be used for urgent needs. For medical emergencies, dial 911. Now available from your iPhone and Android! Please provide this summary of care documentation to your next provider. Your primary care clinician is listed as Suzanne Win.  If you have any questions after today's visit, please call [phone].

## 2018-10-15 ENCOUNTER — OFFICE VISIT (OUTPATIENT)
Dept: CARDIOLOGY CLINIC | Age: 48
End: 2018-10-15

## 2018-10-15 ENCOUNTER — CLINICAL SUPPORT (OUTPATIENT)
Dept: CARDIOLOGY CLINIC | Age: 48
End: 2018-10-15

## 2018-10-15 VITALS
OXYGEN SATURATION: 97 % | DIASTOLIC BLOOD PRESSURE: 66 MMHG | BODY MASS INDEX: 32.47 KG/M2 | RESPIRATION RATE: 18 BRPM | WEIGHT: 231.9 LBS | SYSTOLIC BLOOD PRESSURE: 100 MMHG | HEIGHT: 71 IN | HEART RATE: 87 BPM

## 2018-10-15 DIAGNOSIS — I42.9 CARDIOMYOPATHY, UNSPECIFIED TYPE (HCC): Primary | ICD-10-CM

## 2018-10-15 DIAGNOSIS — I42.8 NICM (NONISCHEMIC CARDIOMYOPATHY) (HCC): ICD-10-CM

## 2018-10-15 NOTE — PROGRESS NOTES
Alejandra Vaughn DNP, ANP-BC Subjective/HPI:  
 
Aranza Hawkins is a 46 y.o. male is here for nonischemic cardiomyopathy. After 90 days of therapy echocardiogram today shows ejection fraction 20-25%. Patient reports some mild dyspnea on exertion and mild fatigue. PCP Provider Kari Birmingham MD 
Past Medical History:  
Diagnosis Date  CAD (coronary artery disease) 08/2018  
 abnormal stress tests  CHF (congestive heart failure) ([facility] [address] 07/2018 No past surgical history on file. No Known Allergies No family history on file. Current Outpatient Prescriptions Medication Sig  furosemide (LASIX) 40 mg tablet Take 1 Tab by mouth daily.  sacubitril-valsartan (ENTRESTO) 24 mg/26 mg tablet Take 1 Tab by mouth two (2) times a day.  carvedilol (COREG) 6.25 mg tablet Take 1 Tab by mouth two (2) times daily (with meals). D/C metoprolol  potassium 99 mg tablet Take 99 mg by mouth daily.  aspirin (ASPIR-81 PO) Take 81 mg by mouth daily. No current facility-administered medications for this visit. Vitals:  
 10/15/18 1508 10/15/18 1513 BP: 104/60 100/66 Pulse: 87 Resp: 18 SpO2: 97% Weight: 231 lb 14.4 oz (105.2 kg) Height: 5' 11\" (1.803 m) Social History Social History  Marital status: SINGLE Spouse name: N/A  
 Number of children: N/A  
 Years of education: N/A Occupational History  Not on file. Social History Main Topics  Smoking status: Former Smoker Packs/day: 1.00 Years: 35.00 Types: Cigarettes Quit date: 2/19/2018  Smokeless tobacco: Current User Types: Chew  Alcohol use Yes Comment: on occassion  Drug use: No  
 Sexual activity: Yes Other Topics Concern  Not on file Social History Narrative I have reviewed the nurses notes, vitals, problem list, allergy list, medical history, family, social history and medications. Review of Symptoms: General: Pt denies excessive weight gain or loss. Pt is able to conduct ADL's HEENT: Denies blurred vision, headaches, epistaxis and difficulty swallowing. Respiratory: Denies shortness of breath, + HERRERA, wheezing or stridor. Cardiovascular: Denies precordial pain, palpitations, edema or PND Gastrointestinal: Denies poor appetite, indigestion, abdominal pain or blood in stool Musculoskeletal: Denies pain or swelling from muscles or joints Neurologic: Denies tremor, paresthesias, or sensory motor disturbance Skin: Denies rash, itching or texture change. Physical Exam:   
 
General: Well developed, in no acute distress, cooperative and alert HEENT: No carotid bruits, no JVD, trach is midline. Neck Supple, PEERL, EOM intact. Heart:  Normal S1/S2 negative S3 or S4. Regular, no murmur, gallop or rub.  
Respiratory: Clear bilaterally x 4, no wheezing or rales Abdomen:   Soft, non-tender, no masses, bowel sounds are active.  
Extremities:  No edema, normal cap refill, no cyanosis, atraumatic. Neuro: A&Ox3, speech clear, gait stable. Skin: Skin color is normal. No rashes or lesions. Non diaphoretic Vascular: 2+ pulses symmetric in all extremities Cardiographics ECG: Sinus rhythm No results found for this or any previous visit. Cardiology Labs: 
Lab Results Component Value Date/Time Cholesterol, total 181 07/05/2018 08:49 AM  
 HDL Cholesterol 51 07/05/2018 08:49 AM  
 LDL, calculated 124 (H) 07/05/2018 08:49 AM  
 Triglyceride 31 07/05/2018 08:49 AM  
 
 
Lab Results Component Value Date/Time  Sodium 141 09/12/2018 03:14 PM  
 Potassium 4.8 09/12/2018 03:14 PM  
 Chloride 101 09/12/2018 03:14 PM  
 CO2 23 09/12/2018 03:14 PM  
 Glucose 93 09/12/2018 03:14 PM  
 BUN 20 09/12/2018 03:14 PM  
 Creatinine 1.01 09/12/2018 03:14 PM  
 BUN/Creatinine ratio 20 09/12/2018 03:14 PM  
 GFR est  09/12/2018 03:14 PM  
 GFR est non-AA 86 09/12/2018 03:14 PM  
 Calcium 9.6 09/12/2018 03:14 PM  
 Bilirubin, total 0.3 09/12/2018 03:14 PM  
 AST (SGOT) 16 09/12/2018 03:14 PM  
 Alk. phosphatase 72 09/12/2018 03:14 PM  
 Protein, total 6.9 09/12/2018 03:14 PM  
 Albumin 4.7 09/12/2018 03:14 PM  
 A-G Ratio 2.1 09/12/2018 03:14 PM  
 ALT (SGPT) 16 09/12/2018 03:14 PM  
  
 
 
 Assessment: 
 
 Assessment:  
 
Diagnoses and all orders for this visit: 1. Cardiomyopathy, unspecified type ([facility] -     AMB POC EKG ROUTINE W/ 12 LEADS, INTER & REP 
-     REFERRAL TO CARDIAC ELECTROPHYSIOLOGY 2. NICM (nonischemic cardiomyopathy) ([facility] 
-     REFERRAL TO CARDIAC ELECTROPHYSIOLOGY 
 
 
  ICD-10-CM ICD-9-CM 1. Cardiomyopathy, unspecified type (HCC) I42.9 425.4 AMB POC EKG ROUTINE W/ 12 LEADS, INTER & REP  
   REFERRAL TO CARDIAC ELECTROPHYSIOLOGY 2. NICM (nonischemic cardiomyopathy) ([facility]) I42.8 425.4 REFERRAL TO CARDIAC ELECTROPHYSIOLOGY Orders Placed This Encounter  REFERRAL TO CARDIAC ELECTROPHYSIOLOGY Referral Priority:   Routine Referral Type:   Consultation Referral Reason:   Specialty Services Required Referral Location:   [facility] Referred to Provider:   Jazzmine Vera MD  
  Requested Specialty:   Cardiology Number of Visits Requested:   1  AMB POC EKG ROUTINE W/ 12 LEADS, INTER & REP Order Specific Question:   Reason for Exam: Answer:   routine Plan:  
 
Patient is a 52-year-old male with a history of nonischemic cardiomyopathy, has been on failure therapy greater than 90 days repeat echocardiogram today shows ejection fraction 20-25%. Blood pressure 100/66 unable to further titrate medications nor add Spironolactone at this time. Will have patient consult with electrophysiology for ICD placement. Follow-up with cardiology in 3 months Jacob Abarca NP This note was created using voice recognition software. Despite editing, there may be syntax errors. [facility] 10/15/2018 Patient seen, examined by me personally. Plan discussed as detailed. Agree with note as outlined by  NP. I confirm findings in history and physical exam. No additional findings noted. Agree with plan as outlined above.   
 
Brooklynn Zurita MD

## 2018-10-15 NOTE — MR AVS SNAPSHOT
Annabel Belle [address] 
[phone] Patient: Marzena Vega MRN: [id number]:[id number] Visit Information Date & Time Provider Department Dept. Phone Encounter #  
 10/15/2018  3:30 PM Jyothi Reid, [address] [place] [address] [facility] [phone] [id number] Your Appointments 10/25/2018  9:30 AM  
New Patient with MD Cherie D[facility]az [facility] Appt Note: Per Dr. Shania Lujan, see Dr. Kasey Hammond for ICD  
 [address]  
[phone] [address] Upcoming Health Maintenance Date Due Pneumococcal 19-64 Medium Risk (1 of 1 - PPSV23) 2/25/1987 DTaP/Tdap/Td series (1 - Tdap) 2/25/1989 Shingrix Vaccine Age 50> (1 of 2) 2/25/2018 FOBT Q 1 YEAR AGE 50-75 2/25/2018 Influenza Age 5 to Adult 8/1/2018 Allergies as of 10/15/2018  Review Complete On: 10/15/2018 By: Yue Pate LPN No Known Allergies Current Immunizations  Never Reviewed No immunizations on file. Not reviewed this visit You Were Diagnosed With   
  
 Codes Comments Cardiomyopathy, unspecified type ([facility]    -  Primary ICD-10-CM: I42.9 ICD-9-CM: 425.4 NICM (nonischemic cardiomyopathy) ([facility]     ICD-10-CM: I42.8 ICD-9-CM: 425.4 Vitals BP Pulse Resp Height(growth percentile) Weight(growth percentile) SpO2  
 100/66 (BP 1 Location: Right arm, BP Patient Position: Sitting) 87 18 5' 11\" (1.803 m) 231 lb 14.4 oz (105.2 kg) 97% BMI Smoking Status 32.34 kg/m2 Former Smoker Vitals History BMI and BSA Data Body Mass Index Body Surface Area  
 32.34 kg/m 2 2.3 m 2 Preferred Pharmacy Pharmacy Name Phone [facility],[address] Floor, [address] [place] [address]  [phone] Your Updated Medication List  
  
   
 This list is accurate as of 10/15/18  4:12 PM.  Always use your most recent med list.  
  
  
  
  
 ASPIR-81 PO Take 81 mg by mouth daily. carvedilol 6.25 mg tablet Commonly known as:  Michell Gloss Take 1 Tab by mouth two (2) times daily (with meals). D/C metoprolol  
  
 furosemide 40 mg tablet Commonly known as:  LASIX Take 1 Tab by mouth daily. potassium 99 mg tablet Take 99 mg by mouth daily. sacubitril-valsartan 24-26 mg tablet Commonly known as:  ENTRESTO Take 1 Tab by mouth two (2) times a day. We Performed the Following AMB POC EKG ROUTINE W/ 12 LEADS, INTER & REP [18155 CPT(R)] REFERRAL TO CARDIAC ELECTROPHYSIOLOGY [REF11 Custom] Referral Information Referral ID Referred By Referred To  
  
 [id number] Verla Gowers, MD   
   [address] [address] [address] [facility] Phone: [phone] Fax: [phone] Visits Status Start Date End Date 1 New Request 10/15/18 10/15/19 If your referral has a status of pending review or denied, additional information will be sent to support the outcome of this decision. Introducing [facility]! Dear Gerald Villarreal: Thank you for requesting a [employer] account. Our records indicate that you already have an active [employer] account. You can access your account anytime at [email or web address] Did you know that you can access your hospital and ER discharge instructions at any time in [employer]? You can also review all of your test results from your hospital stay or ER visit. Additional Information If you have questions, please visit the Frequently Asked Questions section of the [employer] website at [email or web address] Remember, [employer] is NOT to be used for urgent needs. For medical emergencies, dial 911. Now available from your iPhone and Android! Please provide this summary of care documentation to your next provider. Your primary care clinician is listed as Suzanne Win. If you have any questions after today's visit, please call [phone].

## 2018-10-15 NOTE — PROGRESS NOTES
1. Have you been to the ER, urgent care clinic since your last visit? Hospitalized since your last visit? NO 
 
2. Have you seen or consulted any other health care providers outside of the [address] [place] [address] since your last visit? Include any pap smears or colon screening. NO 
 
NO CARDIAC C/O.

## 2018-10-16 ENCOUNTER — DOCUMENTATION ONLY (OUTPATIENT)
Dept: CARDIOLOGY CLINIC | Age: 48
End: 2018-10-16

## 2018-10-16 NOTE — PROGRESS NOTES
Received fax from [facility], Nelson Michel 24/26 mg has been approved from 10/3/18 through 10/3/19

## 2018-10-25 ENCOUNTER — HOSPITAL ENCOUNTER (OUTPATIENT)
Dept: GENERAL RADIOLOGY | Age: 48
Discharge: HOME | End: 2018-10-25
Payer: COMMERCIAL

## 2018-10-25 ENCOUNTER — OFFICE VISIT (OUTPATIENT)
Dept: CARDIOLOGY CLINIC | Age: 48
End: 2018-10-25

## 2018-10-25 VITALS
SYSTOLIC BLOOD PRESSURE: 90 MMHG | HEART RATE: 88 BPM | HEIGHT: 71 IN | BODY MASS INDEX: 32.31 KG/M2 | OXYGEN SATURATION: 95 % | WEIGHT: 230.8 LBS | DIASTOLIC BLOOD PRESSURE: 64 MMHG | RESPIRATION RATE: 18 BRPM

## 2018-10-25 DIAGNOSIS — I50.22 CHRONIC SYSTOLIC CONGESTIVE HEART FAILURE (HCC): ICD-10-CM

## 2018-10-25 DIAGNOSIS — I42.0 DILATED CARDIOMYOPATHY (HCC): Primary | ICD-10-CM

## 2018-10-25 DIAGNOSIS — I42.0 DILATED CARDIOMYOPATHY (HCC): ICD-10-CM

## 2018-10-25 PROCEDURE — 71046 X-RAY EXAM CHEST 2 VIEWS: CPT

## 2018-10-25 NOTE — PROGRESS NOTES
1. Have you been to the ER, urgent care clinic since your last visit? Hospitalized since your last visit? No    2. Have you seen or consulted any other health care providers outside of the [address] [place] [address] since your last visit? Include any pap smears or colon screening. No    Chief Complaint   Patient presents with    Cardiomyopathy     Ref by Dr. Lore Gonzalez for ICD. Had EKG here 10-15-18. Denied cardiac symptoms.

## 2018-10-25 NOTE — PROGRESS NOTES
Subjective:      Brennen Young is a 46 y.o. male is here for EP consult. He has sob with exertion. His lvef has not improved on med rx. Patient Active Problem List    Diagnosis Date Noted    S/P cardiac cath 2018    Myocardial infarct, old 2018    Cardiomyopathy ([facility] 2018      Calli Win MD  Past Medical History:   Diagnosis Date    CAD (coronary artery disease) 2018    abnormal stress tests    CHF (congestive heart failure) ([facility] 2018      History reviewed. No pertinent surgical history. No Known Allergies   History reviewed. No pertinent family history. negative for cardiac disease  Social History     Socioeconomic History    Marital status: SINGLE     Spouse name: Not on file    Number of children: Not on file    Years of education: Not on file    Highest education level: Not on file   Social Needs    Financial resource strain: Not on file    Food insecurity - worry: Not on file    Food insecurity - inability: Not on file    Transportation needs - medical: Not on file   [employer] needs - non-medical: Not on file   Occupational History    Not on file   Tobacco Use    Smoking status: Former Smoker     Packs/day: 1.00     Years: 35.00     Pack years: 35.00     Types: Cigarettes     Last attempt to quit: 2018     Years since quittin.6    Smokeless tobacco: Current User     Types: Chew   Substance and Sexual Activity    Alcohol use: Yes     Comment: on occassion    Drug use: No    Sexual activity: Yes   Other Topics Concern    Not on file   Social History Narrative    Not on file     Current Outpatient Medications   Medication Sig    furosemide (LASIX) 40 mg tablet Take 1 Tab by mouth daily.  sacubitril-valsartan (ENTRESTO) 24 mg/26 mg tablet Take 1 Tab by mouth two (2) times a day.  carvedilol (COREG) 6.25 mg tablet Take 1 Tab by mouth two (2) times daily (with meals).  D/C metoprolol    potassium 99 mg tablet Take 99 mg by mouth daily.  aspirin (ASPIR-81 PO) Take 81 mg by mouth daily. No current facility-administered medications for this visit. Vitals:    10/25/18 0932 10/25/18 0938   BP: 96/60 90/64   Pulse: 88    Resp: 18    SpO2: 95%    Weight: 230 lb 12.8 oz (104.7 kg)    Height: 5' 11\" (1.803 m)        I have reviewed the nurses notes, vitals, problem list, allergy list, medical history, family, social history and medications. Review of Symptoms:    General: Pt denies excessive weight gain or loss. Pt is able to conduct ADL's  HEENT: Denies blurred vision, headaches, epistaxis and difficulty swallowing. Respiratory: Denies shortness of breath, HERRERA, wheezing or stridor. Cardiovascular: Denies precordial pain, palpitations, edema or PND  Gastrointestinal: Denies poor appetite, indigestion, abdominal pain or blood in stool  Urinary: Denies dysuria, pyuria  Musculoskeletal: Denies pain or swelling from muscles or joints  Neurologic: Denies tremor, paresthesias, or sensory motor disturbance  Skin: Denies rash, itching or texture change. Psych: Denies depression      Physical Exam:      General: Well developed, in no acute distress. HEENT: Eyes - PERRL, no jvd  Heart:  Normal S1/S2 negative S3 or S4. Regular, no murmur, gallop or rub.   Respiratory: Clear bilaterally x 4, no wheezing or rales  Abdomen:   Soft, non-tender, bowel sounds are active.   Extremities:  No edema, normal cap refill, no cyanosis. Musculoskeletal: No clubbing  Neuro: A&Ox3, speech clear, gait stable. Skin: Skin color is normal. No rashes or lesions. Non diaphoretic  Vascular: 2+ pulses symmetric in all extremities    Cardiographics    Ekg: nsr    Echo - ef 20-25    No results found for this or any previous visit.       Lab Results   Component Value Date/Time    WBC 8.9 09/12/2018 03:14 PM    HGB 14.3 09/12/2018 03:14 PM    HCT 44.2 09/12/2018 03:14 PM    PLATELET 647 97/74/3398 03:14 PM    MCV 89 09/12/2018 03:14 PM      Lab Results Component Value Date/Time    Sodium 141 09/12/2018 03:14 PM    Potassium 4.8 09/12/2018 03:14 PM    Chloride 101 09/12/2018 03:14 PM    CO2 23 09/12/2018 03:14 PM    Glucose 93 09/12/2018 03:14 PM    BUN 20 09/12/2018 03:14 PM    Creatinine 1.01 09/12/2018 03:14 PM    BUN/Creatinine ratio 20 09/12/2018 03:14 PM    GFR est  09/12/2018 03:14 PM    GFR est non-AA 86 09/12/2018 03:14 PM    Calcium 9.6 09/12/2018 03:14 PM    Bilirubin, total 0.3 09/12/2018 03:14 PM    AST (SGOT) 16 09/12/2018 03:14 PM    Alk. phosphatase 72 09/12/2018 03:14 PM    Protein, total 6.9 09/12/2018 03:14 PM    Albumin 4.7 09/12/2018 03:14 PM    A-G Ratio 2.1 09/12/2018 03:14 PM    ALT (SGPT) 16 09/12/2018 03:14 PM         Assessment:     Assessment:        ICD-10-CM ICD-9-CM    1. Dilated cardiomyopathy (HCC) I42.0 425.4 CBC WITH AUTOMATED DIFF      PROTHROMBIN TIME + INR      METABOLIC PANEL, COMPREHENSIVE      XR CHEST PA LAT   2. Chronic systolic congestive heart failure (HCC) I50.22 428.22 CBC WITH AUTOMATED DIFF     428.0 PROTHROMBIN TIME + INR      METABOLIC PANEL, COMPREHENSIVE      XR CHEST PA LAT     Orders Placed This Encounter    XR CHEST PA LAT     Standing Status:   Future     Standing Expiration Date:   11/25/2019     Order Specific Question:   Reason for Exam     Answer:   pre op     Order Specific Question:   Is Patient Allergic to Contrast Dye? Answer:   Unknown    CBC WITH AUTOMATED DIFF    PROTHROMBIN TIME + INR    METABOLIC PANEL, COMPREHENSIVE        Plan:   Mr Moi Rust is a pleasant gentleman with a dilated cardiomyopathy (ef 25%) on med rx and class III CHF. He is a candidate for an ICD. I discussed the risks/benefits/alternatives of the procedure with the patient. Risks include (but are not limited to) bleeding, heart block, infection, cva/mi/tamponade/death. The patient understands and agrees to proceed. Thank you for this interesting consultation.       Continue medical management for cardiomyopathy, chf.    Thank you for allowing me to participate in Fazal Solano 's care.     Fredy Emery MD, Xavier Alcaraz

## 2018-10-25 NOTE — H&P (VIEW-ONLY)
Subjective:  
  
Chapin Guajardo is a 46 y.o. male is here for EP consult. He has sob with exertion. His lvef has not improved on med rx. Patient Active Problem List  
 Diagnosis Date Noted  S/P cardiac cath 2018  Myocardial infarct, old 2018  Cardiomyopathy ([facility] 2018 Jing Box, MD 
Past Medical History:  
Diagnosis Date  CAD (coronary artery disease) 2018  
 abnormal stress tests  CHF (congestive heart failure) ([facility] 2018 History reviewed. No pertinent surgical history. No Known Allergies History reviewed. No pertinent family history. negative for cardiac disease Social History Socioeconomic History  Marital status: SINGLE Spouse name: Not on file  Number of children: Not on file  Years of education: Not on file  Highest education level: Not on file Social Needs  Financial resource strain: Not on file  Food insecurity - worry: Not on file  Food insecurity - inability: Not on file  Transportation needs - medical: Not on file  Transportation needs - non-medical: Not on file Occupational History  Not on file Tobacco Use  Smoking status: Former Smoker Packs/day: 1.00 Years: 35.00 Pack years: 35.00 Types: Cigarettes Last attempt to quit: 2018 Years since quittin.6  Smokeless tobacco: Current User Types: Chew Substance and Sexual Activity  Alcohol use: Yes Comment: on occassion  Drug use: No  
 Sexual activity: Yes Other Topics Concern  Not on file Social History Narrative  Not on file Current Outpatient Medications Medication Sig  furosemide (LASIX) 40 mg tablet Take 1 Tab by mouth daily.  sacubitril-valsartan (ENTRESTO) 24 mg/26 mg tablet Take 1 Tab by mouth two (2) times a day.  carvedilol (COREG) 6.25 mg tablet Take 1 Tab by mouth two (2) times daily (with meals). D/C metoprolol  potassium 99 mg tablet Take 99 mg by mouth daily.  aspirin (ASPIR-81 PO) Take 81 mg by mouth daily. No current facility-administered medications for this visit. Vitals:  
 10/25/18 4981 10/25/18 5334 BP: 96/60 90/64 Pulse: 88 Resp: 18 SpO2: 95% Weight: 230 lb 12.8 oz (104.7 kg) Height: 5' 11\" (1.803 m) I have reviewed the nurses notes, vitals, problem list, allergy list, medical history, family, social history and medications. Review of Symptoms: 
 
General: Pt denies excessive weight gain or loss. Pt is able to conduct ADL's HEENT: Denies blurred vision, headaches, epistaxis and difficulty swallowing. Respiratory: Denies shortness of breath, HERRERA, wheezing or stridor. Cardiovascular: Denies precordial pain, palpitations, edema or PND Gastrointestinal: Denies poor appetite, indigestion, abdominal pain or blood in stool Urinary: Denies dysuria, pyuria Musculoskeletal: Denies pain or swelling from muscles or joints Neurologic: Denies tremor, paresthesias, or sensory motor disturbance Skin: Denies rash, itching or texture change. Psych: Denies depression Physical Exam:   
 
General: Well developed, in no acute distress. HEENT: Eyes - PERRL, no jvd Heart:  Normal S1/S2 negative S3 or S4. Regular, no murmur, gallop or rub.  
Respiratory: Clear bilaterally x 4, no wheezing or rales Abdomen:   Soft, non-tender, bowel sounds are active.  
Extremities:  No edema, normal cap refill, no cyanosis. Musculoskeletal: No clubbing Neuro: A&Ox3, speech clear, gait stable. Skin: Skin color is normal. No rashes or lesions. Non diaphoretic Vascular: 2+ pulses symmetric in all extremities Cardiographics Ekg: nsr 
 
Echo - ef 20-25 No results found for this or any previous visit. Lab Results Component Value Date/Time  WBC 8.9 09/12/2018 03:14 PM  
 HGB 14.3 09/12/2018 03:14 PM  
 HCT 44.2 09/12/2018 03:14 PM  
 PLATELET 749 19/62/9234 03:14 PM  
 MCV 89 09/12/2018 03:14 PM  
  
Lab Results Component Value Date/Time Sodium 141 09/12/2018 03:14 PM  
 Potassium 4.8 09/12/2018 03:14 PM  
 Chloride 101 09/12/2018 03:14 PM  
 CO2 23 09/12/2018 03:14 PM  
 Glucose 93 09/12/2018 03:14 PM  
 BUN 20 09/12/2018 03:14 PM  
 Creatinine 1.01 09/12/2018 03:14 PM  
 BUN/Creatinine ratio 20 09/12/2018 03:14 PM  
 GFR est  09/12/2018 03:14 PM  
 GFR est non-AA 86 09/12/2018 03:14 PM  
 Calcium 9.6 09/12/2018 03:14 PM  
 Bilirubin, total 0.3 09/12/2018 03:14 PM  
 AST (SGOT) 16 09/12/2018 03:14 PM  
 Alk. phosphatase 72 09/12/2018 03:14 PM  
 Protein, total 6.9 09/12/2018 03:14 PM  
 Albumin 4.7 09/12/2018 03:14 PM  
 A-G Ratio 2.1 09/12/2018 03:14 PM  
 ALT (SGPT) 16 09/12/2018 03:14 PM  
  
 
 Assessment: 
 
 Assessment: ICD-10-CM ICD-9-CM 1. Dilated cardiomyopathy (HCC) I42.0 425.4 CBC WITH AUTOMATED DIFF PROTHROMBIN TIME + INR  
   METABOLIC PANEL, COMPREHENSIVE  
   XR CHEST PA LAT 2. Chronic systolic congestive heart failure (HCC) I50.22 428.22 CBC WITH AUTOMATED DIFF  
  428.0 PROTHROMBIN TIME + INR  
   METABOLIC PANEL, COMPREHENSIVE  
   XR CHEST PA LAT Orders Placed This Encounter  XR CHEST PA LAT Standing Status:   Future Standing Expiration Date:   11/25/2019 Order Specific Question:   Reason for Exam  
  Answer:   pre op Order Specific Question:   Is Patient Allergic to Contrast Dye? Answer:   Unknown  CBC WITH AUTOMATED DIFF  
 PROTHROMBIN TIME + INR  METABOLIC PANEL, COMPREHENSIVE Plan: Mr Dora Sosa is a pleasant gentleman with a dilated cardiomyopathy (ef 25%) on med rx and class III CHF. He is a candidate for an ICD. I discussed the risks/benefits/alternatives of the procedure with the patient. Risks include (but are not limited to) bleeding, heart block, infection, cva/mi/tamponade/death. The patient understands and agrees to proceed. Thank you for this interesting consultation. Continue medical management for cardiomyopathy, chf. 
 
Thank you for allowing me to participate in Alexander Nglisbeth 's care.  
 
Mike Emery MD, Saroj Denson

## 2018-10-26 LAB
ALBUMIN SERPL-MCNC: 4.8 G/DL (ref 3.5–5.5)
ALBUMIN/GLOB SERPL: 2.3 {RATIO} (ref 1.2–2.2)
ALP SERPL-CCNC: 76 IU/L (ref 39–117)
ALT SERPL-CCNC: 17 IU/L (ref 0–44)
AST SERPL-CCNC: 14 IU/L (ref 0–40)
BASOPHILS # BLD AUTO: 0 X10E3/UL (ref 0–0.2)
BASOPHILS NFR BLD AUTO: 0 %
BILIRUB SERPL-MCNC: 0.3 MG/DL (ref 0–1.2)
BUN SERPL-MCNC: 18 MG/DL (ref 6–24)
BUN/CREAT SERPL: 15 (ref 9–20)
CALCIUM SERPL-MCNC: 9.4 MG/DL (ref 8.7–10.2)
CHLORIDE SERPL-SCNC: 101 MMOL/L (ref 96–106)
CO2 SERPL-SCNC: 24 MMOL/L (ref 20–29)
CREAT SERPL-MCNC: 1.18 MG/DL (ref 0.76–1.27)
EOSINOPHIL # BLD AUTO: 0.3 X10E3/UL (ref 0–0.4)
EOSINOPHIL NFR BLD AUTO: 4 %
ERYTHROCYTE [DISTWIDTH] IN BLOOD BY AUTOMATED COUNT: 14.9 % (ref 12.3–15.4)
GLOBULIN SER CALC-MCNC: 2.1 G/DL (ref 1.5–4.5)
GLUCOSE SERPL-MCNC: 90 MG/DL (ref 65–99)
HCT VFR BLD AUTO: 42.2 % (ref 37.5–51)
HGB BLD-MCNC: 14.4 G/DL (ref 13–17.7)
IMM GRANULOCYTES # BLD: 0 X10E3/UL (ref 0–0.1)
IMM GRANULOCYTES NFR BLD: 0 %
INR PPP: 1 (ref 0.8–1.2)
LYMPHOCYTES # BLD AUTO: 1.5 X10E3/UL (ref 0.7–3.1)
LYMPHOCYTES NFR BLD AUTO: 19 %
MCH RBC QN AUTO: 29 PG (ref 26.6–33)
MCHC RBC AUTO-ENTMCNC: 34.1 G/DL (ref 31.5–35.7)
MCV RBC AUTO: 85 FL (ref 79–97)
MONOCYTES # BLD AUTO: 1 X10E3/UL (ref 0.1–0.9)
MONOCYTES NFR BLD AUTO: 14 %
NEUTROPHILS # BLD AUTO: 4.8 X10E3/UL (ref 1.4–7)
NEUTROPHILS NFR BLD AUTO: 63 %
PLATELET # BLD AUTO: 261 X10E3/UL (ref 150–379)
POTASSIUM SERPL-SCNC: 4.7 MMOL/L (ref 3.5–5.2)
PROT SERPL-MCNC: 6.9 G/DL (ref 6–8.5)
PROTHROMBIN TIME: 10.5 SEC (ref 9.1–12)
RBC # BLD AUTO: 4.97 X10E6/UL (ref 4.14–5.8)
SODIUM SERPL-SCNC: 142 MMOL/L (ref 134–144)
WBC # BLD AUTO: 7.7 X10E3/UL (ref 3.4–10.8)

## 2018-11-01 ENCOUNTER — ANESTHESIA (OUTPATIENT)
Dept: NON INVASIVE DIAGNOSTICS | Age: 48
End: 2018-11-01
Payer: COMMERCIAL

## 2018-11-01 ENCOUNTER — HOSPITAL ENCOUNTER (OUTPATIENT)
Dept: NON INVASIVE DIAGNOSTICS | Age: 48
Discharge: HOME | End: 2018-11-01
Attending: INTERNAL MEDICINE | Admitting: INTERNAL MEDICINE
Payer: COMMERCIAL

## 2018-11-01 ENCOUNTER — APPOINTMENT (OUTPATIENT)
Dept: GENERAL RADIOLOGY | Age: 48
End: 2018-11-01
Attending: INTERNAL MEDICINE
Payer: COMMERCIAL

## 2018-11-01 ENCOUNTER — ANESTHESIA EVENT (OUTPATIENT)
Dept: NON INVASIVE DIAGNOSTICS | Age: 48
End: 2018-11-01
Payer: COMMERCIAL

## 2018-11-01 VITALS
TEMPERATURE: 97.6 F | SYSTOLIC BLOOD PRESSURE: 87 MMHG | DIASTOLIC BLOOD PRESSURE: 53 MMHG | RESPIRATION RATE: 22 BRPM | HEART RATE: 79 BPM | HEIGHT: 71 IN | WEIGHT: 225 LBS | OXYGEN SATURATION: 95 % | BODY MASS INDEX: 31.5 KG/M2

## 2018-11-01 PROCEDURE — 76060000033 HC ANESTHESIA 1 TO 1.5 HR

## 2018-11-01 PROCEDURE — 77030018836 HC SOL IRR NACL ICUM -A

## 2018-11-01 PROCEDURE — 74011250636 HC RX REV CODE- 250/636

## 2018-11-01 PROCEDURE — 77030018673

## 2018-11-01 PROCEDURE — 77030018729 HC ELECTRD DEFIB PAD CARD -B

## 2018-11-01 PROCEDURE — C1777 LEAD, AICD, ENDO SINGLE COIL: HCPCS

## 2018-11-01 PROCEDURE — C1893 INTRO/SHEATH, FIXED,NON-PEEL: HCPCS

## 2018-11-01 PROCEDURE — C1722 AICD, SINGLE CHAMBER: HCPCS

## 2018-11-01 PROCEDURE — 71045 X-RAY EXAM CHEST 1 VIEW: CPT

## 2018-11-01 PROCEDURE — 74011000250 HC RX REV CODE- 250

## 2018-11-01 PROCEDURE — 74011250636 HC RX REV CODE- 250/636: Performed by: ANESTHESIOLOGY

## 2018-11-01 PROCEDURE — 74011250637 HC RX REV CODE- 250/637

## 2018-11-01 PROCEDURE — 74011250636 HC RX REV CODE- 250/636: Performed by: INTERNAL MEDICINE

## 2018-11-01 PROCEDURE — 93641 EP EVL 1/2CHMB PAC CVDFB TST: CPT

## 2018-11-01 PROCEDURE — C1894 INTRO/SHEATH, NON-LASER: HCPCS

## 2018-11-01 PROCEDURE — 77030002996 HC SUT SLK J&J -A

## 2018-11-01 PROCEDURE — A4565 SLINGS: HCPCS

## 2018-11-01 PROCEDURE — 77030037713 HC CLOSR DEV INCIS ZIP STRY -B

## 2018-11-01 PROCEDURE — 77030031139 HC SUT VCRL2 J&J -A

## 2018-11-01 PROCEDURE — 74011000258 HC RX REV CODE- 258: Performed by: NURSE PRACTITIONER

## 2018-11-01 RX ORDER — PHENYLEPHRINE HCL IN 0.9% NACL 0.4MG/10ML
SYRINGE (ML) INTRAVENOUS AS NEEDED
Status: DISCONTINUED | OUTPATIENT
Start: 2018-11-01 | End: 2018-11-01 | Stop reason: HOSPADM

## 2018-11-01 RX ORDER — HEPARIN SODIUM 200 [USP'U]/100ML
INJECTION, SOLUTION INTRAVENOUS
Status: COMPLETED
Start: 2018-11-01 | End: 2018-11-01

## 2018-11-01 RX ORDER — CEFAZOLIN SODIUM/WATER 2 G/20 ML
SYRINGE (ML) INTRAVENOUS
Status: COMPLETED
Start: 2018-11-01 | End: 2018-11-01

## 2018-11-01 RX ORDER — LIDOCAINE HYDROCHLORIDE 10 MG/ML
INJECTION INFILTRATION; PERINEURAL
Status: COMPLETED
Start: 2018-11-01 | End: 2018-11-01

## 2018-11-01 RX ORDER — MIDAZOLAM HYDROCHLORIDE 1 MG/ML
INJECTION, SOLUTION INTRAMUSCULAR; INTRAVENOUS AS NEEDED
Status: DISCONTINUED | OUTPATIENT
Start: 2018-11-01 | End: 2018-11-01 | Stop reason: HOSPADM

## 2018-11-01 RX ORDER — KETAMINE HYDROCHLORIDE 10 MG/ML
INJECTION, SOLUTION INTRAMUSCULAR; INTRAVENOUS AS NEEDED
Status: DISCONTINUED | OUTPATIENT
Start: 2018-11-01 | End: 2018-11-01 | Stop reason: HOSPADM

## 2018-11-01 RX ORDER — BACITRACIN 50000 [IU]/1
50000 INJECTION, POWDER, FOR SOLUTION INTRAMUSCULAR ONCE
Status: COMPLETED | OUTPATIENT
Start: 2018-11-01 | End: 2018-11-01

## 2018-11-01 RX ORDER — SODIUM CHLORIDE 9 MG/ML
INJECTION, SOLUTION INTRAVENOUS
Status: DISCONTINUED | OUTPATIENT
Start: 2018-11-01 | End: 2018-11-01 | Stop reason: HOSPADM

## 2018-11-01 RX ORDER — HYDROCODONE BITARTRATE AND ACETAMINOPHEN 5; 325 MG/1; MG/1
1 TABLET ORAL
Status: DISCONTINUED | OUTPATIENT
Start: 2018-11-01 | End: 2018-11-01 | Stop reason: HOSPADM

## 2018-11-01 RX ORDER — SODIUM CHLORIDE 0.9 % (FLUSH) 0.9 %
5-10 SYRINGE (ML) INJECTION AS NEEDED
Status: DISCONTINUED | OUTPATIENT
Start: 2018-11-01 | End: 2018-11-01 | Stop reason: HOSPADM

## 2018-11-01 RX ORDER — MIDAZOLAM HYDROCHLORIDE 1 MG/ML
1-5 INJECTION, SOLUTION INTRAMUSCULAR; INTRAVENOUS
Status: DISCONTINUED | OUTPATIENT
Start: 2018-11-01 | End: 2018-11-01 | Stop reason: HOSPADM

## 2018-11-01 RX ORDER — FENTANYL CITRATE 50 UG/ML
12.5-5 INJECTION, SOLUTION INTRAMUSCULAR; INTRAVENOUS
Status: DISCONTINUED | OUTPATIENT
Start: 2018-11-01 | End: 2018-11-01 | Stop reason: HOSPADM

## 2018-11-01 RX ORDER — FENTANYL CITRATE 50 UG/ML
INJECTION, SOLUTION INTRAMUSCULAR; INTRAVENOUS AS NEEDED
Status: DISCONTINUED | OUTPATIENT
Start: 2018-11-01 | End: 2018-11-01 | Stop reason: HOSPADM

## 2018-11-01 RX ORDER — ACETAMINOPHEN 325 MG/1
TABLET ORAL
Status: COMPLETED
Start: 2018-11-01 | End: 2018-11-01

## 2018-11-01 RX ORDER — HEPARIN SODIUM 200 [USP'U]/100ML
1000 INJECTION, SOLUTION INTRAVENOUS ONCE
Status: COMPLETED | OUTPATIENT
Start: 2018-11-01 | End: 2018-11-01

## 2018-11-01 RX ORDER — NALOXONE HYDROCHLORIDE 0.4 MG/ML
0.4 INJECTION, SOLUTION INTRAMUSCULAR; INTRAVENOUS; SUBCUTANEOUS AS NEEDED
Status: DISCONTINUED | OUTPATIENT
Start: 2018-11-01 | End: 2018-11-01 | Stop reason: HOSPADM

## 2018-11-01 RX ORDER — LIDOCAINE HYDROCHLORIDE AND EPINEPHRINE 10; 10 MG/ML; UG/ML
1-20 INJECTION, SOLUTION INFILTRATION; PERINEURAL
Status: DISCONTINUED | OUTPATIENT
Start: 2018-11-01 | End: 2018-11-01 | Stop reason: HOSPADM

## 2018-11-01 RX ORDER — ACETAMINOPHEN 325 MG/1
650 TABLET ORAL
Status: DISCONTINUED | OUTPATIENT
Start: 2018-11-01 | End: 2018-11-01 | Stop reason: HOSPADM

## 2018-11-01 RX ORDER — SODIUM CHLORIDE 0.9 % (FLUSH) 0.9 %
5-10 SYRINGE (ML) INJECTION EVERY 8 HOURS
Status: DISCONTINUED | OUTPATIENT
Start: 2018-11-01 | End: 2018-11-01 | Stop reason: HOSPADM

## 2018-11-01 RX ORDER — PROPOFOL 10 MG/ML
INJECTION, EMULSION INTRAVENOUS
Status: DISCONTINUED | OUTPATIENT
Start: 2018-11-01 | End: 2018-11-01 | Stop reason: HOSPADM

## 2018-11-01 RX ORDER — BACITRACIN 50000 [IU]/1
INJECTION, POWDER, FOR SOLUTION INTRAMUSCULAR
Status: COMPLETED
Start: 2018-11-01 | End: 2018-11-01

## 2018-11-01 RX ORDER — CEFAZOLIN SODIUM/WATER 2 G/20 ML
2 SYRINGE (ML) INTRAVENOUS ONCE
Status: COMPLETED | OUTPATIENT
Start: 2018-11-01 | End: 2018-11-01

## 2018-11-01 RX ADMIN — PHENYLEPHRINE HYDROCHLORIDE 60 MCG/MIN: 10 INJECTION INTRAVENOUS at 10:54

## 2018-11-01 RX ADMIN — LIDOCAINE HYDROCHLORIDE 30 ML: 10 INJECTION, SOLUTION INFILTRATION; PERINEURAL at 08:22

## 2018-11-01 RX ADMIN — Medication 2 G: at 08:08

## 2018-11-01 RX ADMIN — Medication 120 MCG: at 08:24

## 2018-11-01 RX ADMIN — KETAMINE HYDROCHLORIDE 20 MG: 10 INJECTION, SOLUTION INTRAMUSCULAR; INTRAVENOUS at 08:13

## 2018-11-01 RX ADMIN — ACETAMINOPHEN 650 MG: 325 TABLET ORAL at 11:54

## 2018-11-01 RX ADMIN — SODIUM CHLORIDE 200 ML: 900 INJECTION, SOLUTION INTRAVENOUS at 09:44

## 2018-11-01 RX ADMIN — MIDAZOLAM HYDROCHLORIDE 1 MG: 1 INJECTION, SOLUTION INTRAMUSCULAR; INTRAVENOUS at 08:05

## 2018-11-01 RX ADMIN — SODIUM CHLORIDE: 9 INJECTION, SOLUTION INTRAVENOUS at 07:45

## 2018-11-01 RX ADMIN — KETAMINE HYDROCHLORIDE 20 MG: 10 INJECTION, SOLUTION INTRAMUSCULAR; INTRAVENOUS at 08:06

## 2018-11-01 RX ADMIN — BACITRACIN 50000 UNITS: 5000 INJECTION, POWDER, FOR SOLUTION INTRAMUSCULAR at 08:43

## 2018-11-01 RX ADMIN — PROPOFOL 50 MCG/KG/MIN: 10 INJECTION, EMULSION INTRAVENOUS at 08:05

## 2018-11-01 RX ADMIN — KETAMINE HYDROCHLORIDE 10 MG: 10 INJECTION, SOLUTION INTRAMUSCULAR; INTRAVENOUS at 08:42

## 2018-11-01 RX ADMIN — FENTANYL CITRATE 50 MCG: 50 INJECTION, SOLUTION INTRAMUSCULAR; INTRAVENOUS at 08:05

## 2018-11-01 RX ADMIN — KETAMINE HYDROCHLORIDE 10 MG: 10 INJECTION, SOLUTION INTRAMUSCULAR; INTRAVENOUS at 08:39

## 2018-11-01 RX ADMIN — Medication 160 MCG: at 08:10

## 2018-11-01 RX ADMIN — BACITRACIN 50000 UNITS: 50000 INJECTION, POWDER, FOR SOLUTION INTRAMUSCULAR at 08:43

## 2018-11-01 RX ADMIN — MIDAZOLAM HYDROCHLORIDE 1 MG: 1 INJECTION, SOLUTION INTRAMUSCULAR; INTRAVENOUS at 08:16

## 2018-11-01 RX ADMIN — KETAMINE HYDROCHLORIDE 20 MG: 10 INJECTION, SOLUTION INTRAMUSCULAR; INTRAVENOUS at 08:21

## 2018-11-01 RX ADMIN — KETAMINE HYDROCHLORIDE 10 MG: 10 INJECTION, SOLUTION INTRAMUSCULAR; INTRAVENOUS at 08:34

## 2018-11-01 RX ADMIN — HEPARIN SODIUM 1000 UNITS: 200 INJECTION, SOLUTION INTRAVENOUS at 08:00

## 2018-11-01 RX ADMIN — FENTANYL CITRATE 50 MCG: 50 INJECTION, SOLUTION INTRAMUSCULAR; INTRAVENOUS at 08:17

## 2018-11-01 NOTE — DISCHARGE INSTRUCTIONS
[address] [place] [address], [address] [place] [address], [address] [facility]  [phone]        ICD INSERTION DISCHARGE INSTRUCTIONS    Patient ID:  Yunior Barr  [id number]  33 y.o.  1970    Admit Date: 11/1/2018    Discharge Date: 11/1/2018     Admitting Physician: Andres Green MD     Discharge Physician: Andres Green MD    Admission Diagnoses:   dilated cardiomyopathy    Discharge Diagnoses: Active Problems:    * No active hospital problems. *      Discharge Condition: Good    Cardiology Procedures this Admission:  S/P ICD implantation. Disposition: home    Reference discharge instructions provided by nursing for diet and activity. Follow-up with ICD/PM clinic in 2 weeks. Call [phone] to make an appointment. Signed:  MAGY Yen  11/1/2018  8:36 AM      DISCHARGE INSTRUCTIONS FOR PATIENTS WITH ICD'S    1. Remember to call for an appointment in 2-4 weeks [phone]. 2. Medic Alert Bracelets are available from your pharmacist to wear at all times if you choose to wear one. 3. Carry your ID card for your ICD with you at all times. This card will be given to you in the hospital or mailed to you. 4. The ICD will bulge slightly under your skin. The bulge will decrease in size over the next few weeks. Please notify the doctor's office if you notice any of the following around your ICD site:   A.  A bruise that does not go away. B.  Soreness or yellow, green, or brown drainage from the site. C. Any swelling from the site. D. If you have a fever of 100 degrees or higher that lasts for a few days. INCISION CARE       1.  Leave the dressing over your site until it dissolves on its own, usually in a few weeks. 2.  You may shower after 3 days as long as your incision isnt submerged or directly sprayed upon until well healed. 3.  For comfort, wear loose fitting clothing.   4.  Report any signs of infection, fever, pain, swelling, redness, oozing, or heat at site especially if these symptoms increase after the first 3 to 4 days. ACTIVITY PRECAUTIONS     1. Avoid rough contact with the implant site. 2. No driving for 14 days. 3. Avoid lifting your arm over your head, carrying anything on the affected side, or lifting over 10 pounds for 30 days. For the first 2 days only bend your arm at the elbow. 4. Any extreme activity such as golf, weight lifting or exercise biking should be restricted for 60 days. 5. Do not carry objects by holding them against your implant site. 6.  No shooting rifles or any type of gun with the affected shoulder permanently. 7.  Welding and chainsaws are prohibited. SPECIAL PRECAUTIONS     1. You should avoid all strong magnetic fields, such as arc welding, large transformers, large motors. Some ICD devices will beep if it detects a strong magnet. If this occurs, move out of the area. 2.  You may or may not have an MRI which uses a strong magnet to take pictures. 3.  Treatments or surgery that requires diathermy or electrocautery should be discussed with your doctor before scheduled. 4.  Avoid radio frequency transmitters, including radar. 5.  Advise dentist or other medical personnel you see that you have an ICD. 6.  Cell phones and microwave oven use is okay. 7.  If you plan to move or take a trip to a new area, the doctor's office will give you a name of a doctor to contact for any problems. SPECIAL INSTRUCTIONS ON SHOCKS     1. Notify your doctor for any of the following:       A. Anytime a shock is received in a 24 hour period. An office visit is not usually required for a single shock. B.  Two or more shocks in a row. If you do not feel well, call the [facility], otherwise call your doctor. This may require an office visit. C. Two or more shocks spaced apart by several hours. This may require an office visit. 2.  Keep a record of events. Include date, time, symptoms and activity at that time.         ANTIBIOTIC THERAPY    During the first 8 weeks after your ICD insertion, you may need antibiotics before any dental work or certain tests or operations. Let the dentist or doctor who is caring for you know that you have had an implanted device.

## 2018-11-01 NOTE — PROGRESS NOTES
TRANSFER - IN REPORT: 
 
Verbal report received from Jaylon Fenton and Nav Casper on [address]  being received from EP for routine post - op. Report consisted of patients Situation, Background, Assessment and Recommendations(SBAR). Information from the following report(s) SBAR, Procedure Summary, MAR, Recent Results and Cardiac Rhythm NSR was reviewed with the receiving clinician. Opportunity for questions and clarification was provided. Assessment completed upon patients arrival to [facility] and care assumed. Cardiac Cath Lab Recovery Arrival Note: 
 
Noemy Ocampo Sr arrived to [facility] recovery area. Patient procedure= ICD insertion. Patient on cardiac monitor, non-invasive blood pressure, SPO2 monitor. On O2 @ 2 lpm via NC.  IV  of NS on pump at 50 ml/hr. Patient status doing well without problems. Patient is A&Ox 4. Patient reports no complaints. PROCEDURE SITE CHECK: 
 
Procedure site:without any bleeding and dressing dry and intact, denies pain/discomfort reported at procedure site. No change in patient status. Continue to monitor patient and status.

## 2018-11-01 NOTE — PROGRESS NOTES
Called Anesthesia, Dr Chago Kamara by to see pt, ordered to restart JAYLA to keep SBP >90. Following through with order.

## 2018-11-01 NOTE — PROGRESS NOTES
I have reviewed discharge instructions with the patient and girlfriend. The patient and girlfriend verbalized understanding. Removed PIV from left AC. Escorted pt to exit via wheelchair, girlfriend driving pt home.

## 2018-11-01 NOTE — ANESTHESIA PREPROCEDURE EVALUATION
Anesthetic History No history of anesthetic complications Review of Systems / Medical History Patient summary reviewed, nursing notes reviewed and pertinent labs reviewed Pulmonary Comments: Former smoker - 35 pack years Neuro/Psych Within defined limits Cardiovascular CHF: NYHA Classification III Past MI and CAD Exercise tolerance: <4 METS Comments: TTE (10/15/18): Severe, diffuse hypokinesis, EF=20-25% GI/Hepatic/Renal 
Within defined limits Endo/Other Obesity Other Findings Physical Exam 
 
Airway Mallampati: II 
TM Distance: > 6 cm Neck ROM: normal range of motion Mouth opening: Normal 
 
 Cardiovascular Regular rate and rhythm,  S1 and S2 normal,  no murmur, click, rub, or gallop Dental 
 
Dentition: Full lower dentures, Full upper dentures and Edentulous Pulmonary Breath sounds clear to auscultation Abdominal 
GI exam deferred Other Findings Anesthetic Plan ASA: 3 Anesthesia type: MAC and total IV anesthesia Induction: Intravenous Anesthetic plan and risks discussed with: Patient

## 2018-11-01 NOTE — PROGRESS NOTES
Cardiac Cath Lab Recovery Arrival Note: 
 
 
Christian Brandon Sr arrived to Cardiac Cath Lab, Recovery Area. Staff introduced to patient. Patient identifiers verified with NAME and DATE OF BIRTH. Procedure verified with patient. Consent forms reviewed and signed by patient or authorized representative and verified. Allergies verified. Patient and family oriented to department. Patient and family informed of procedure and plan of care. Questions answered with review. Patient prepped for procedure, per orders from physician, prior to arrival. 
 
Patient on cardiac monitor, non-invasive blood pressure, SPO2 monitor. On room air. Patient is A&Ox 4. Patient reports no discomfort. Patient in stretcher, in low position, with side rails up, call bell within reach, patient instructed to call if assistance as needed. Patient prep in: [address], [place] [address]. Family in: waiting room, Sofia Oneal [phone].   
Prep by: Garret Abdullahi and Shukri Yusuf RN

## 2018-11-01 NOTE — PROGRESS NOTES
Pt hypotensive, pt awake, encouraged pt to pump ankles, lowered HOB, elevated legs, called EVAN Jaquez NP, giving NS bolus 200 ml, pt with no voiced complaints. PCXR completed. Monitoring closely.

## 2018-11-01 NOTE — PROCEDURES
[address] [place] [address]  [phone]    Indications and Pre-Procedure Diagnosis:  León Arango is a 46 y.o. male with dilated cardiomyopathy and CHF is referred for single chamber defibrillator. The left ventricular ejection fraction is 20-25% and the patient is NYHA Class III. The patient has been on ace inhibitor and beta blocker therapy for greater than 3 months. Post Procedure Diagnosis:  Dilated cardiomyopathy  CHF    ICD Implant Procedure and Findings:  Informed consent was obtained and the patient was premedicated with cefazolin. The procedure was performed under local anesthesia. Continuous pulse oximetry and cuff pressure were monitored. During the procedure, the patient received Versed, Fentanyl and Propofol for sedation per anesthesia personnel. The left deltopectoral area was prepped and draped in the usual sterile fashion and was liberally infiltrated with 1% lidocaine. An incision was made over the left subpectoral area and a generator pocket was manually dissected. Access was achieved in the left axillary vein under fluoroscopic guidance and using the seldinger technique. Through the left axillary vein, pacing/defibrillation leads were positioned in appropriate regions in the right heart chambers where satisfactory pacing and sensing parameters were measured. Stability of the leads was assessed with deep breathing and there was no diaphragmatic pacing at 10V output. The leads were anchored using the sleeves and a pulse generator pocket fashioned using blunt dissection. The leads were then connected to the pulse generator. The pulse generator pocket was then liberally infiltrated with bacitracin solution, and the device implanted with a single silk fixation suture in the header to prevent migration. The wound was closed in layers using intermittent 2-0 Vicryl and Zipline suture sleeve. A bio-occlusive dressing were applied to the skin.  Fluoroscopy and total procedure times were 1 and 20 minutes respectively. Estimated blood loss  <10 ml. Sharp count: correct. Specimen(s) collected: none. The following procedure related complication occurred: none. The following problems were encountered: none. Findings: successful ICD placement.     Device Data Measurements:  Lead Sensing (mV) Threshold (V)Pulse Width (ms) Impedance (Ohms)    RV 5.1  1.1  0.5   1005      Defibrillator Function Testing  Induction Rhythm Energy (J) Impedance Polarity Success        (Ohms)  On T  VF  41  53  RV-  Yes         Final Programmed Parameters  Bradycardia pacing rate  40 bpm  Pacing Mode    VVI  Pacing Output    3.5 V@ 0.5 ms  Fibrillation Detect Interval  210 bpm  First Shock Energy   41 J  Electrode Configuration  RV -  ATP Status    On      Supplies Summary available in the chart    Delroy Quarles MD, Yanely Rivera

## 2018-11-01 NOTE — PROGRESS NOTES
Patient blood pressure after ambulation 80/54 then after sitting for approx 2 minutes up to 93/45 then 95/64. Patient steady on feet and completely asymptomatic. Patient states that his baseline bp is only 90/60's. Spoke with Francisca Osorio about blood pressure and she said is it ok for patient to be discharged as he is not far from baseline and asymptomatic.

## 2018-11-01 NOTE — PROGRESS NOTES
Received pt from the waiting room ambulatory to the recovery area. Assess done  Consents signed. Pt has been NPO since 2100 last nite. No questions asked.

## 2018-11-01 NOTE — INTERVAL H&P NOTE
H&P Update: 
Yunior Barr was seen and examined. History and physical has been reviewed. The patient has been examined.  There have been no significant clinical changes since the completion of the originally dated History and Physical. 
 
Signed By: Andres Green MD   
 November 1, 2018 8:13 AM

## 2018-11-01 NOTE — PROGRESS NOTES
BP improved, able to wean JAYLA. Pt tolerated lunch well, medicated with Tylenol 650 mg for incisional discomfort \"5/10\".

## 2018-11-20 ENCOUNTER — CLINICAL SUPPORT (OUTPATIENT)
Dept: CARDIOLOGY CLINIC | Age: 48
End: 2018-11-20

## 2018-11-20 DIAGNOSIS — I42.9 CARDIOMYOPATHY, UNSPECIFIED TYPE (HCC): ICD-10-CM

## 2018-11-20 DIAGNOSIS — Z95.810 PRESENCE OF AUTOMATIC CARDIOVERTER/DEFIBRILLATOR (AICD): Primary | ICD-10-CM

## 2018-12-29 RX ORDER — SACUBITRIL AND VALSARTAN 24; 26 MG/1; MG/1
TABLET, FILM COATED ORAL
Qty: 60 TAB | Refills: 0 | Status: SHIPPED | OUTPATIENT
Start: 2018-12-29 | End: 2019-01-14 | Stop reason: SDUPTHER

## 2019-01-14 ENCOUNTER — TELEPHONE (OUTPATIENT)
Dept: CARDIOLOGY CLINIC | Age: 49
End: 2019-01-14

## 2019-01-14 RX ORDER — SACUBITRIL AND VALSARTAN 24; 26 MG/1; MG/1
TABLET, FILM COATED ORAL
Qty: 60 TAB | Refills: 0 | Status: SHIPPED | OUTPATIENT
Start: 2019-01-14 | End: 2019-05-29 | Stop reason: SDUPTHER

## 2019-01-14 NOTE — TELEPHONE ENCOUNTER
Patient needs entresto refill today, he is going out of town tomorrow. Cover my meds, [id number] ,PRIOR AUTHO REQUEST WE NEED TO SEND TO INS. Pharmacy states sent fax 12/29/2018,. Thanks

## 2019-01-14 NOTE — TELEPHONE ENCOUNTER
Sent copy of [place] approval for Tyson to Berenice Thibodeaux @ [address] [place] [address] Drug store @ [phone], pt has been approved from 10/3/2018-10/03/2019

## 2019-02-21 ENCOUNTER — CLINICAL SUPPORT (OUTPATIENT)
Dept: CARDIOLOGY CLINIC | Age: 49
End: 2019-02-21

## 2019-02-21 ENCOUNTER — OFFICE VISIT (OUTPATIENT)
Dept: CARDIOLOGY CLINIC | Age: 49
End: 2019-02-21

## 2019-02-21 VITALS
BODY MASS INDEX: 33.59 KG/M2 | DIASTOLIC BLOOD PRESSURE: 72 MMHG | WEIGHT: 239.9 LBS | SYSTOLIC BLOOD PRESSURE: 110 MMHG | OXYGEN SATURATION: 97 % | RESPIRATION RATE: 18 BRPM | HEIGHT: 71 IN | HEART RATE: 88 BPM

## 2019-02-21 DIAGNOSIS — E78.2 MIXED HYPERLIPIDEMIA: ICD-10-CM

## 2019-02-21 DIAGNOSIS — I50.22 CHRONIC SYSTOLIC CONGESTIVE HEART FAILURE (HCC): ICD-10-CM

## 2019-02-21 DIAGNOSIS — Z95.810 PRESENCE OF AUTOMATIC CARDIOVERTER/DEFIBRILLATOR (AICD): Primary | ICD-10-CM

## 2019-02-21 DIAGNOSIS — I42.0 DILATED CARDIOMYOPATHY (HCC): ICD-10-CM

## 2019-02-21 DIAGNOSIS — Z95.810 PRESENCE OF AUTOMATIC CARDIOVERTER/DEFIBRILLATOR (AICD): ICD-10-CM

## 2019-02-21 DIAGNOSIS — I49.9 IRREGULAR HEARTBEAT: Primary | ICD-10-CM

## 2019-02-21 DIAGNOSIS — I42.9 CARDIOMYOPATHY, UNSPECIFIED TYPE (HCC): ICD-10-CM

## 2019-02-21 NOTE — PROGRESS NOTES
Subjective:  
  
Catalina Cooper is a 46 y.o. male is here for follow up s/p ICD implant in 2018. He is doing well. The patient denies chest pain/ shortness of breath, orthopnea, PND, LE edema, palpitations, syncope, presyncope or fatigue. Patient Active Problem List  
 Diagnosis Date Noted  S/P cardiac cath 2018  Myocardial infarct, old 2018  [facility] 2018 Lm Marshall MD 
Past Medical History:  
Diagnosis Date  CAD (coronary artery disease) 2018  
 abnormal stress tests  CHF (congestive heart failure) ([facility] 2018 History reviewed. No pertinent surgical history. No Known Allergies Family History Problem Relation Age of Onset  Hypertension Mother  Alzheimer Father   
 negative for cardiac disease Social History Socioeconomic History  Marital status: SINGLE Spouse name: Not on file  Number of children: Not on file  Years of education: Not on file  Highest education level: Not on file Tobacco Use  Smoking status: Former Smoker Packs/day: 1.00 Years: 35.00 Pack years: 35.00 Types: Cigarettes Last attempt to quit: 2018 Years since quittin.0  Smokeless tobacco: Current User Types: Chew Substance and Sexual Activity  Alcohol use: Yes Comment: on occassion  Drug use: No  
 Sexual activity: Yes Current Outpatient Medications Medication Sig  carvedilol (COREG) 6.25 mg tablet TAKE ONE TABLET BY MOUTH 2 TIMES A DAY WITH MEALS (discontinue metoprolol)  ENTRESTO 24-26 mg tablet TAKE ONE TABLET BY MOUTH 2 TIMES A DAY  furosemide (LASIX) 40 mg tablet Take 1 Tab by mouth daily.  potassium 99 mg tablet Take 99 mg by mouth daily.  aspirin (ASPIR-81 PO) Take 81 mg by mouth daily. No current facility-administered medications for this visit. Vitals:  
 19 1040 BP: 110/72 Pulse: 88 Resp: 18 SpO2: 97% Weight: 239 lb 14.4 oz (108.8 kg) Height: 5' 11\" (1.803 m) I have reviewed the nurses notes, vitals, problem list, allergy list, medical history, family, social history and medications. Review of Symptoms: 
 
General: Pt denies excessive weight gain or loss. Pt is able to conduct ADL's HEENT: Denies blurred vision, headaches, epistaxis and difficulty swallowing. Respiratory: Denies shortness of breath, HERRERA, wheezing or stridor. Cardiovascular: Denies precordial pain, palpitations, edema or PND Gastrointestinal: Denies poor appetite, indigestion, abdominal pain or blood in stool Urinary: Denies dysuria, pyuria Musculoskeletal: Denies pain or swelling from muscles or joints Neurologic: Denies tremor, paresthesias, or sensory motor disturbance Skin: Denies rash, itching or texture change. Psych: Denies depression Physical Exam:   
 
General: Well developed, in no acute distress. HEENT: Eyes - PERRL, no jvd Heart:  Normal S1/S2 negative S3 or S4. Regular, no murmur, gallop or rub.  
Respiratory: Clear bilaterally x 4, no wheezing or rales Abdomen:   Soft, non-tender, bowel sounds are active.  
Extremities:  No edema, normal cap refill, no cyanosis. Musculoskeletal: No clubbing Neuro: A&Ox3, speech clear, gait stable. Skin: Skin color is normal. No rashes or lesions. Non diaphoretic Vascular: 2+ pulses symmetric in all extremities Cardiographics Ekg: sinus rhythm No results found for this or any previous visit. f Lab Results Component Value Date/Time WBC 7.7 10/25/2018 11:16 AM  
 HGB 14.4 10/25/2018 11:16 AM  
 HCT 42.2 10/25/2018 11:16 AM  
 PLATELET 690 72/72/2708 11:16 AM  
 MCV 85 10/25/2018 11:16 AM  
  
Lab Results Component Value Date/Time  Sodium 142 10/25/2018 11:16 AM  
 Potassium 4.7 10/25/2018 11:16 AM  
 Chloride 101 10/25/2018 11:16 AM  
 CO2 24 10/25/2018 11:16 AM  
 Glucose 90 10/25/2018 11:16 AM  
 BUN 18 10/25/2018 11:16 AM  
 Creatinine 1.18 10/25/2018 11:16 AM  
 BUN/Creatinine ratio 15 10/25/2018 11:16 AM  
 GFR est AA 83 10/25/2018 11:16 AM  
 GFR est non-AA 72 10/25/2018 11:16 AM  
 Calcium 9.4 10/25/2018 11:16 AM  
 Bilirubin, total 0.3 10/25/2018 11:16 AM  
 AST (SGOT) 14 10/25/2018 11:16 AM  
 Alk. phosphatase 76 10/25/2018 11:16 AM  
 Protein, total 6.9 10/25/2018 11:16 AM  
 Albumin 4.8 10/25/2018 11:16 AM  
 A-G Ratio 2.3 (H) 10/25/2018 11:16 AM  
 ALT (SGPT) 17 10/25/2018 11:16 AM  
  
 
 Assessment: 
 
 Assessment: ICD-10-CM ICD-9-CM 1. Irregular heartbeat I49.9 427.9 AMB POC EKG ROUTINE W/ 12 LEADS, INTER & REP 2. Chronic systolic congestive heart failure (HCC) I50.22 428.22   
  428.0 3. Dilated cardiomyopathy (HCC) I42.0 425.4 4. Presence of automatic cardioverter/defibrillator (AICD) Z95.810 V45.02   
5. Mixed hyperlipidemia E78.2 272.2 Orders Placed This Encounter  AMB POC EKG ROUTINE W/ 12 LEADS, INTER & REP Order Specific Question:   Reason for Exam: Answer:   routine Plan:  
Mr. Mera Boss is here for follow up s/p ICD implant for dilated cardiomyopathy in 11/2018. He is feeling well. Device interrogation demonstrates normal functioning. Continue current medical therapy and follow up per device clinic and OV in one year. Continue medical management for hyperlipidemia, CHF. Thank you for allowing me to participate in [address] [place] [address]. Nathalie Geronimo MD 
 
Patient seen and examined by me with nurse practitioner. I personally performed all components of the history, physical, and medical decision making and agree with the assessment and plan with minor modifications as noted. Doing well. In sinus. Cont med rx for cardiomyopathy, chf and hyperlipidemia. F/u in one year Nathalie Geronimo MD, [facility]

## 2019-02-21 NOTE — PROGRESS NOTES
Chief Complaint Patient presents with  Irregular Heart Beat  
  3 month post op follow up 1. Have you been to the ER, urgent care clinic since your last visit? Hospitalized since your last visit? No 
 
2. Have you seen or consulted any other health care providers outside of the [address] [place] [address] since your last visit? Include any pap smears or colon screening.  No

## 2019-05-29 ENCOUNTER — CLINICAL SUPPORT (OUTPATIENT)
Dept: CARDIOLOGY CLINIC | Age: 49
End: 2019-05-29

## 2019-05-29 DIAGNOSIS — I42.0 DILATED CARDIOMYOPATHY (HCC): ICD-10-CM

## 2019-05-29 DIAGNOSIS — Z95.810 PRESENCE OF AUTOMATIC CARDIOVERTER/DEFIBRILLATOR (AICD): Primary | ICD-10-CM

## 2019-08-15 RX ORDER — CARVEDILOL 6.25 MG/1
TABLET ORAL
Qty: 60 TAB | Refills: 0 | Status: SHIPPED | OUTPATIENT
Start: 2019-08-15 | End: 2019-09-16 | Stop reason: SDUPTHER

## 2019-09-17 RX ORDER — CARVEDILOL 6.25 MG/1
TABLET ORAL
Qty: 60 TAB | Refills: 0 | Status: SHIPPED | OUTPATIENT
Start: 2019-09-17 | End: 2019-10-12 | Stop reason: SDUPTHER

## 2019-09-19 ENCOUNTER — CLINICAL SUPPORT (OUTPATIENT)
Dept: CARDIOLOGY CLINIC | Age: 49
End: 2019-09-19

## 2019-09-19 DIAGNOSIS — I42.0 DILATED CARDIOMYOPATHY (HCC): ICD-10-CM

## 2019-09-19 DIAGNOSIS — Z95.810 PRESENCE OF AUTOMATIC CARDIOVERTER/DEFIBRILLATOR (AICD): Primary | ICD-10-CM

## 2019-10-13 RX ORDER — CARVEDILOL 6.25 MG/1
TABLET ORAL
Qty: 60 TAB | Refills: 0 | Status: SHIPPED | OUTPATIENT
Start: 2019-10-13 | End: 2019-10-22 | Stop reason: SDUPTHER

## 2019-10-22 ENCOUNTER — OFFICE VISIT (OUTPATIENT)
Dept: CARDIOLOGY CLINIC | Age: 49
End: 2019-10-22

## 2019-10-22 VITALS
DIASTOLIC BLOOD PRESSURE: 70 MMHG | HEART RATE: 100 BPM | WEIGHT: 233.3 LBS | SYSTOLIC BLOOD PRESSURE: 110 MMHG | BODY MASS INDEX: 32.66 KG/M2 | RESPIRATION RATE: 16 BRPM | HEIGHT: 71 IN | OXYGEN SATURATION: 96 %

## 2019-10-22 DIAGNOSIS — I42.9 CARDIOMYOPATHY, UNSPECIFIED TYPE (HCC): Primary | ICD-10-CM

## 2019-10-22 DIAGNOSIS — Z95.810 PRESENCE OF AUTOMATIC CARDIOVERTER/DEFIBRILLATOR (AICD): ICD-10-CM

## 2019-10-22 DIAGNOSIS — E78.2 MIXED HYPERLIPIDEMIA: ICD-10-CM

## 2019-10-22 RX ORDER — CARVEDILOL 6.25 MG/1
TABLET ORAL
Qty: 180 TAB | Refills: 4 | Status: SHIPPED | OUTPATIENT
Start: 2019-10-22 | End: 2020-11-17 | Stop reason: SDUPTHER

## 2019-10-22 NOTE — PROGRESS NOTES
1. Have you been to the ER, urgent care clinic since your last visit? Hospitalized since your last visit? No    2. Have you seen or consulted any other health care providers outside of the [address] [place] [address] since your last visit? Include any pap smears or colon screening.  No    Chief Complaint   Patient presents with    Follow-up    Cardiomyopathy

## 2019-10-22 NOTE — PROGRESS NOTES
[address] [place] [address] [facility]  [phone]     Subjective:      Lisset Fang is a 44 y.o. male is here for routine f/u. Last seen by us 10/18. He had ICD insertion  performed by Dr Alex Munguia.  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 His weight is down 6 lbs since his last OV in  with EP. HR slightly up, has been out of carvedilol since Saturday       The patient denies chest pain/ shortness of breath, orthopnea, PND, LE edema, palpitations, syncope, or presyncope.        Patient Active Problem List    Diagnosis Date Noted    S/P cardiac cath 2018    Myocardial infarct, old 2018    Cardiomyopathy ([facility] 2018      Julio Cesar Win MD  Past Medical History:   Diagnosis Date    CAD (coronary artery disease) 2018    abnormal stress tests    CHF (congestive heart failure) ([facility] 2018      Past Surgical History:   Procedure Laterality Date    HX IMPLANTABLE CARDIOVERTER DEFIBRILLATOR      HX OTHER SURGICAL      Cath     No Known Allergies   Family History   Problem Relation Age of Onset    Hypertension Mother     Alzheimer Father       Social History     Socioeconomic History    Marital status:      Spouse name: Not on file    Number of children: Not on file    Years of education: Not on file    Highest education level: Not on file   Occupational History    Not on file   Social Needs    Financial resource strain: Not on file    Food insecurity:     Worry: Not on file     Inability: Not on file    Transportation needs:     Medical: Not on file     Non-medical: Not on file   Tobacco Use    Smoking status: Former Smoker     Packs/day: 1.00     Years: 35.00     Pack years: 35.00     Types: Cigarettes     Last attempt to quit: 2018     Years since quittin.6    Smokeless tobacco: Current User     Types: Chew    Tobacco comment: occasional cigarette   Substance and Sexual Activity    Alcohol use: Yes     Comment: social    Drug use: No    Sexual activity: Yes   Lifestyle    Physical activity:     Days per week: Not on file     Minutes per session: Not on file    Stress: Not on file   Relationships    Social connections:     Talks on phone: Not on file     Gets together: Not on file     Attends Mosque service: Not on file     Active member of club or organization: Not on file     Attends meetings of clubs or organizations: Not on file     Relationship status: Not on file    Intimate partner violence:     Fear of current or ex partner: Not on file     Emotionally abused: Not on file     Physically abused: Not on file     Forced sexual activity: Not on file   Other Topics Concern    Not on file   Social History Narrative    Not on file      Current Outpatient Medications   Medication Sig    carvedilol (COREG) 6.25 mg tablet TAKE ONE TABLET BY MOUTH 2 TIMES A DAY WITH MEALS (discontinue metoprolol)    ENTRESTO 24-26 mg tablet TAKE ONE TABLET BY MOUTH 2 TIMES A DAY    furosemide (LASIX) 40 mg tablet TAKE ONE TABLET BY MOUTH DAILY    potassium 99 mg tablet Take 99 mg by mouth daily.  aspirin (ASPIR-81 PO) Take 81 mg by mouth daily. No current facility-administered medications for this visit. Review of Symptoms:  11 systems reviewed, negative other than as stated in the HPI    Physical ExamPhysical Exam:    Vitals:    10/22/19 1033   BP: 110/70   Pulse: 100   Resp: 16   SpO2: 96%   Weight: 233 lb 4.8 oz (105.8 kg)   Height: 5' 11\" (1.803 m)     Body mass index is 32.54 kg/m². General PE  Gen:  NAD  Mental Status - Alert. General Appearance - Not in acute distress. HEENT:  PERRL, no carotid bruits or JVD  Chest and Lung Exam   Inspection: Accessory muscles - No use of accessory muscles in breathing.    Auscultation:   Breath sounds: - Normal.   Cardiovascular   Inspection: Jugular vein - Bilateral - Inspection Normal.   Palpation/Percussion: Apical Impulse: - Normal.   Auscultation: Rhythm - Regular. Heart Sounds - S1 WNL and S2 WNL. No S3 or S4. Murmurs & Other Heart Sounds: Auscultation of the heart reveals - No Murmurs. Peripheral Vascular   Upper Extremity: Inspection - Bilateral - No Cyanotic nailbeds or Digital clubbing. Lower Extremity:   Palpation: Edema - Bilateral - No edema. Abdomen:   Soft, non-tender, bowel sounds are active. Neuro: A&O times 3, CN and motor grossly WNL    Labs:   Lab Results   Component Value Date/Time    Cholesterol, total 196 03/22/2019 09:22 AM    Cholesterol, total 181 07/05/2018 08:49 AM    HDL Cholesterol 41 03/22/2019 09:22 AM    HDL Cholesterol 51 07/05/2018 08:49 AM    LDL, calculated 146 (H) 03/22/2019 09:22 AM    LDL, calculated 124 (H) 07/05/2018 08:49 AM    Triglyceride 46 03/22/2019 09:22 AM    Triglyceride 31 07/05/2018 08:49 AM     No results found for: CPK, CPKX, CPX  Lab Results   Component Value Date/Time    Sodium 144 03/22/2019 09:22 AM    Potassium 4.3 03/22/2019 09:22 AM    Chloride 107 (H) 03/22/2019 09:22 AM    CO2 22 03/22/2019 09:22 AM    Glucose 111 (H) 03/22/2019 09:22 AM    BUN 13 03/22/2019 09:22 AM    Creatinine 0.88 03/22/2019 09:22 AM    BUN/Creatinine ratio 15 03/22/2019 09:22 AM    GFR est  03/22/2019 09:22 AM    GFR est non-AA 99 03/22/2019 09:22 AM    Calcium 9.7 03/22/2019 09:22 AM    Bilirubin, total 0.3 03/22/2019 09:22 AM    AST (SGOT) 17 03/22/2019 09:22 AM    Alk. phosphatase 68 03/22/2019 09:22 AM    Protein, total 6.5 03/22/2019 09:22 AM    Albumin 4.6 03/22/2019 09:22 AM    A-G Ratio 2.4 (H) 03/22/2019 09:22 AM    ALT (SGPT) 24 03/22/2019 09:22 AM       EKG:  NSR PAC     Assessment:     Assessment:      1. Cardiomyopathy, unspecified type ([facility]    2. Presence of automatic cardioverter/defibrillator (AICD)    3.  Mixed hyperlipidemia        Orders Placed This Encounter    AMB POC EKG ROUTINE W/ 12 LEADS, INTER & REP     Order Specific Question:   Reason for Exam:     Answer:   routine        Plan:     Patient presents for f/u, doing well and stable from cardiac standpoint. He had ICD insertion 11/18 performed by Dr Jo Arzola.  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 His weight is down 6 lbs since his last OV in 2/19 with EP. HR slightly up, has been out of carvedilol since Saturday    NICM, s/p ICD in 11/18  EF 20-25%  Per echo in 10/18  Continue carvedilol, entresto  Will refill BB  Device followed by Dr Trinidad Lawrence  Repeat echo    Normal coronaries per cardiac cath 9/18    HLD  3/19   Working on dietary changes  Lipids and labs followed by PCP---has f/u with pcp in pm       Continue current care and f/u in 6 months. May Whitten NP       [facility]    10/22/2019         Patient seen, examined by me personally. Plan discussed as detailed. Agree with note as outlined by  NP. I confirm findings in history and physical exam. No additional findings noted. Agree with plan as outlined above. CHF well compensated.     Erica Flores MD

## 2019-11-18 ENCOUNTER — TELEPHONE (OUTPATIENT)
Dept: CARDIOLOGY CLINIC | Age: 49
End: 2019-11-18

## 2019-11-19 ENCOUNTER — DOCUMENTATION ONLY (OUTPATIENT)
Dept: CARDIOLOGY CLINIC | Age: 49
End: 2019-11-19

## 2019-11-19 NOTE — PROGRESS NOTES
Received fax from [facility] stating Entresto 24/26 mg has been approved through 11/17/2020

## 2019-12-26 ENCOUNTER — OFFICE VISIT (OUTPATIENT)
Dept: CARDIOLOGY CLINIC | Age: 49
End: 2019-12-26

## 2019-12-26 DIAGNOSIS — I42.9 CARDIOMYOPATHY, UNSPECIFIED TYPE (HCC): ICD-10-CM

## 2019-12-26 DIAGNOSIS — Z95.810 PRESENCE OF AUTOMATIC CARDIOVERTER/DEFIBRILLATOR (AICD): Primary | ICD-10-CM

## 2019-12-30 RX ORDER — SACUBITRIL AND VALSARTAN 24; 26 MG/1; MG/1
TABLET, FILM COATED ORAL
Qty: 60 TAB | Refills: 0 | Status: SHIPPED | OUTPATIENT
Start: 2019-12-30 | End: 2020-03-23 | Stop reason: SDUPTHER

## 2020-01-16 DIAGNOSIS — I50.9 CONGESTIVE HEART FAILURE, UNSPECIFIED HF CHRONICITY, UNSPECIFIED HEART FAILURE TYPE (HCC): ICD-10-CM

## 2020-01-16 RX ORDER — FUROSEMIDE 40 MG/1
TABLET ORAL
Qty: 30 TAB | Refills: 0 | Status: SHIPPED | OUTPATIENT
Start: 2020-01-16 | End: 2020-02-16

## 2020-02-14 DIAGNOSIS — I50.9 CONGESTIVE HEART FAILURE, UNSPECIFIED HF CHRONICITY, UNSPECIFIED HEART FAILURE TYPE (HCC): ICD-10-CM

## 2020-02-16 RX ORDER — FUROSEMIDE 40 MG/1
TABLET ORAL
Qty: 30 TAB | Refills: 0 | Status: SHIPPED | OUTPATIENT
Start: 2020-02-16 | End: 2020-03-16

## 2020-03-16 DIAGNOSIS — I50.9 CONGESTIVE HEART FAILURE, UNSPECIFIED HF CHRONICITY, UNSPECIFIED HEART FAILURE TYPE (HCC): ICD-10-CM

## 2020-03-16 RX ORDER — FUROSEMIDE 40 MG/1
TABLET ORAL
Qty: 30 TAB | Refills: 0 | Status: SHIPPED | OUTPATIENT
Start: 2020-03-16 | End: 2020-04-22

## 2020-03-25 RX ORDER — SACUBITRIL AND VALSARTAN 24; 26 MG/1; MG/1
1 TABLET, FILM COATED ORAL 2 TIMES DAILY
Qty: 180 TAB | Refills: 3 | Status: SHIPPED | OUTPATIENT
Start: 2020-03-25 | End: 2021-05-17

## 2020-04-01 ENCOUNTER — OFFICE VISIT (OUTPATIENT)
Dept: CARDIOLOGY CLINIC | Age: 50
End: 2020-04-01

## 2020-04-01 DIAGNOSIS — Z95.810 PRESENCE OF AUTOMATIC CARDIOVERTER/DEFIBRILLATOR (AICD): Primary | ICD-10-CM

## 2020-04-01 DIAGNOSIS — I42.9 CARDIOMYOPATHY, UNSPECIFIED TYPE (HCC): ICD-10-CM

## 2020-04-22 DIAGNOSIS — I50.9 CONGESTIVE HEART FAILURE, UNSPECIFIED HF CHRONICITY, UNSPECIFIED HEART FAILURE TYPE (HCC): ICD-10-CM

## 2020-04-22 RX ORDER — FUROSEMIDE 40 MG/1
TABLET ORAL
Qty: 30 TAB | Refills: 0 | Status: SHIPPED | OUTPATIENT
Start: 2020-04-22 | End: 2020-05-19 | Stop reason: SDUPTHER

## 2020-05-19 DIAGNOSIS — I50.9 CONGESTIVE HEART FAILURE, UNSPECIFIED HF CHRONICITY, UNSPECIFIED HEART FAILURE TYPE (HCC): ICD-10-CM

## 2020-05-19 RX ORDER — FUROSEMIDE 40 MG/1
40 TABLET ORAL DAILY
Qty: 90 TAB | Refills: 3 | Status: SHIPPED | OUTPATIENT
Start: 2020-05-19 | End: 2021-05-18

## 2020-07-02 ENCOUNTER — OFFICE VISIT (OUTPATIENT)
Dept: CARDIOLOGY CLINIC | Age: 50
End: 2020-07-02

## 2020-07-02 DIAGNOSIS — I42.9 CARDIOMYOPATHY, UNSPECIFIED TYPE (HCC): ICD-10-CM

## 2020-07-02 DIAGNOSIS — Z95.810 PRESENCE OF AUTOMATIC CARDIOVERTER/DEFIBRILLATOR (AICD): Primary | ICD-10-CM

## 2020-07-02 PROBLEM — Z00.00 PHYSICAL EXAM: Chronic | Status: ACTIVE | Noted: 2020-07-02

## 2020-07-02 PROBLEM — Z00.00 PHYSICAL EXAM: Status: ACTIVE | Noted: 2020-07-02

## 2020-10-26 NOTE — PROGRESS NOTES
ELECTROPHYSIOLOGY        Subjective:      Jasmine Stephens is a 44 y.o. male is here for EP follow up. The patient denies chest pain/ shortness of breath, orthopnea, PND, LE edema, palpitations, syncope, presyncope or fatigue. Patient Active Problem List    Diagnosis Date Noted    Physical exam 2020    S/P cardiac cath 2018    Myocardial infarct, old 2018    [facility]      Camelia Win MD  Past Medical History:   Diagnosis Date    CAD (coronary artery disease) 2018    abnormal stress tests    CH[facility]estive heart failure) [facility] 2018      Past Surgical History:   Procedure Laterality Date    HX IMPLANTABLE CARDIOVERTER DEFIBRILLATOR      HX OTHER SURGICAL      Cath     No Known Allergies   Family History   Problem Relation Age of Onset    Hypertension Mother     Alzheimer Father     negative for cardiac disease  Social History     Socioeconomic History    Marital status:      Spouse name: Not on file    Number of children: Not on file    Years of education: Not on file    Highest education level: Not on file   Tobacco Use    Smoking status: Former Smoker     Packs/day: 1.00     Years: 35.00     Pack years: 35.00     Types: Cigarettes     Last attempt to quit: 2018     Years since quittin.6    Smokeless tobacco: Current User     Types: Chew    Tobacco comment: occasional cigarette- will smoke for a week than quit again    Substance and Sexual Activity    Alcohol use: Yes     Comment: social    Drug use: No    Sexual activity: Yes     Current Outpatient Medications   Medication Sig    furosemide (LASIX) 40 mg tablet Take 1 Tab by mouth daily.  sacubitriL-valsartan (Entresto) 24-26 mg tablet Take 1 Tab by mouth two (2) times a day.  carvedilol (COREG) 6.25 mg tablet TAKE ONE TABLET BY MOUTH 2 TIMES A DAY WITH MEALS (discontinue metoprolol)    potassium 99 mg tablet Take 99 mg by mouth daily.     aspirin (ASPIR-81 PO) Take 81 mg by mouth daily. No current facility-administered medications for this visit. Vitals:    10/28/20 1009   BP: 110/78   Pulse: 87   Resp: 18   SpO2: 95%   Weight: 238 lb (108 kg)   Height: 5' 11\" (1.803 m)       I have reviewed the nurses notes, vitals, problem list, allergy list, medical history, family, social history and medications. Review of Symptoms:    General: Pt denies excessive weight gain or loss. Pt is able to conduct ADL's  HEENT: Denies blurred vision, headaches, epistaxis and difficulty swallowing. Respiratory: Denies shortness of breath, HERRERA, wheezing or stridor. Cardiovascular: Denies precordial pain, palpitations, edema or PND  Gastrointestinal: Denies poor appetite, indigestion, abdominal pain or blood in stool  Urinary: Denies dysuria, pyuria  Musculoskeletal: Denies pain or swelling from muscles or joints  Neurologic: Denies tremor, paresthesias, or sensory motor disturbance  Skin: Denies rash, itching or texture change. Psych: Denies depression      Physical Exam:      General: Well developed, in no acute distress. HEENT: Eyes - PERRL, no jvd  Heart:  Normal S1/S2 negative S3 or S4. Regular, no murmur, gallop or rub. Respiratory: Clear bilaterally x 4, no wheezing or rales  Extremities:  No edema, normal cap refill, no cyanosis. Musculoskeletal: No clubbing  Neuro: A&Ox3, speech clear, gait stable. Skin: Skin color is normal. No rashes or lesions. Non diaphoretic. no ulcers  Vascular: 2+ pulses symmetric in all extremities  Psych - judgement intact and orientation is wnl       Cardiographics     No results found for this or any previous visit. ECHO:  10/28  Left ventricle: Systolic function was severely reduced. Ejection fraction  was estimated in the range of 20 % to 25 %. There was severe diffuse  hypokinesis.     Lab Results   Component Value Date/Time    WBC 7.6 07/02/2020 08:43 AM    HGB 14.3 07/02/2020 08:43 AM    HCT 41.9 07/02/2020 08:43 AM    PLATELET 077 98/69/3754 08:43 AM    MCV 85 07/02/2020 08:43 AM      Lab Results   Component Value Date/Time    Sodium 140 07/02/2020 08:43 AM    Potassium 4.3 07/02/2020 08:43 AM    Chloride 105 07/02/2020 08:43 AM    CO2 21 07/02/2020 08:43 AM    Glucose 109 (H) 07/02/2020 08:43 AM    BUN 12 07/02/2020 08:43 AM    Creatinine 0.96 07/02/2020 08:43 AM    BUN/Creatinine ratio 13 07/02/2020 08:43 AM    GFR est  07/02/2020 08:43 AM    GFR est non-AA 91 07/02/2020 08:43 AM    Calcium 9.2 07/02/2020 08:43 AM    Bilirubin, total 0.3 07/02/2020 08:43 AM    Alk. phosphatase 85 07/02/2020 08:43 AM    Protein, total 6.7 07/02/2020 08:43 AM    Albumin 4.4 07/02/2020 08:43 AM    A-G Ratio 1.9 07/02/2020 08:43 AM    ALT (SGPT) 24 07/02/2020 08:43 AM      No results found for: TSH, TSH2, TSH3, TSHP, TSHEXT, TSHEXT        Assessment:           ICD-10-CM ICD-9-CM    1. Cardiomyopathy, unspecified type ([address]  I42.9 425.4    2. Irregular heartbeat  I49.9 427.9    3. Congestive heart failure, unspecified HF chronicity, unspecified heart failure type (HCC)  I50.9 428.0    4. Presence of automatic cardioverter/defibrillator (AICD)  Z95.810 V45.02 AMB POC EKG ROUTINE W/ 12 LEADS, INTER & REP     Orders Placed This Encounter    AMB POC EKG ROUTINE W/ 12 LEADS, INTER & REP     Order Specific Question:   Reason for Exam:     Answer:   routine        Plan:     Mr. Hernandez Thorne is here for follow up s/p ICD implant for dilated cardiomyopathy in 11/2018. He is feeling well. Device interrogation demonstrates normal functioning; brief NSVT - longest 9 sec, on bb and entresto. EF 20-25%, 2018. Normotensive today. Continue current medical therapy and follow up per device clinic and OV in one year.      Continue medical management for hyperlipidemia, CHF. Thank you for allowing me to participate in [address] [place] [address].       Saba Hall NP

## 2020-10-28 ENCOUNTER — CLINICAL SUPPORT (OUTPATIENT)
Dept: CARDIOLOGY CLINIC | Age: 50
End: 2020-10-28
Payer: COMMERCIAL

## 2020-10-28 ENCOUNTER — OFFICE VISIT (OUTPATIENT)
Dept: CARDIOLOGY CLINIC | Age: 50
End: 2020-10-28
Payer: COMMERCIAL

## 2020-10-28 VITALS
OXYGEN SATURATION: 95 % | BODY MASS INDEX: 33.32 KG/M2 | HEIGHT: 71 IN | HEART RATE: 87 BPM | SYSTOLIC BLOOD PRESSURE: 110 MMHG | RESPIRATION RATE: 18 BRPM | WEIGHT: 238 LBS | DIASTOLIC BLOOD PRESSURE: 78 MMHG

## 2020-10-28 DIAGNOSIS — I42.9 CARDIOMYOPATHY, UNSPECIFIED TYPE (HCC): Primary | ICD-10-CM

## 2020-10-28 DIAGNOSIS — I50.9 CONGESTIVE HEART FAILURE, UNSPECIFIED HF CHRONICITY, UNSPECIFIED HEART FAILURE TYPE (HCC): ICD-10-CM

## 2020-10-28 DIAGNOSIS — I49.9 IRREGULAR HEARTBEAT: ICD-10-CM

## 2020-10-28 DIAGNOSIS — Z95.810 PRESENCE OF AUTOMATIC CARDIOVERTER/DEFIBRILLATOR (AICD): ICD-10-CM

## 2020-10-28 DIAGNOSIS — Z95.810 PRESENCE OF AUTOMATIC CARDIOVERTER/DEFIBRILLATOR (AICD): Primary | ICD-10-CM

## 2020-10-28 DIAGNOSIS — I42.9 CARDIOMYOPATHY, UNSPECIFIED TYPE (HCC): ICD-10-CM

## 2020-10-28 PROCEDURE — 93000 ELECTROCARDIOGRAM COMPLETE: CPT | Performed by: NURSE PRACTITIONER

## 2020-10-28 PROCEDURE — 99214 OFFICE O/P EST MOD 30 MIN: CPT | Performed by: NURSE PRACTITIONER

## 2020-10-28 PROCEDURE — 93282 PRGRMG EVAL IMPLANTABLE DFB: CPT

## 2020-10-28 NOTE — PROGRESS NOTES
Chief Complaint   Patient presents with    Pacemaker Check     Denies cardiac sx      1. Have you been to the ER, urgent care clinic since your last visit? Hospitalized since your last visit? No     2. Have you seen or consulted any other health care providers outside of the [facility] since your last visit? Include any pap smears or colon screening.   No

## 2020-11-17 RX ORDER — CARVEDILOL 6.25 MG/1
TABLET ORAL
Qty: 180 TAB | Refills: 4 | Status: SHIPPED | OUTPATIENT
Start: 2020-11-17 | End: 2022-02-18

## 2021-01-28 ENCOUNTER — OFFICE VISIT (OUTPATIENT)
Dept: CARDIOLOGY CLINIC | Age: 51
End: 2021-01-28
Payer: COMMERCIAL

## 2021-01-28 DIAGNOSIS — I42.9 CARDIOMYOPATHY, UNSPECIFIED TYPE (HCC): ICD-10-CM

## 2021-01-28 DIAGNOSIS — Z95.810 PRESENCE OF AUTOMATIC CARDIOVERTER/DEFIBRILLATOR (AICD): Primary | ICD-10-CM

## 2021-01-28 PROCEDURE — 93295 DEV INTERROG REMOTE 1/2/MLT: CPT | Performed by: INTERNAL MEDICINE

## 2021-01-28 PROCEDURE — 93296 REM INTERROG EVL PM/IDS: CPT | Performed by: INTERNAL MEDICINE

## 2021-03-24 ENCOUNTER — TELEPHONE (OUTPATIENT)
Dept: CARDIOLOGY CLINIC | Age: 51
End: 2021-03-24

## 2021-03-24 NOTE — TELEPHONE ENCOUNTER
Called pt, spoke to wife Abner Mario on [place], advised I had [facility] [place] [facility] from insurance for pt. Asked if pt is still taking the medication. She advised he is , he is just taking 1/2 tab in am and pm per . insurance has not changed since here last. Advised I will work on his prior [facility] for [facility] [place] [facility]. Advised pt will need an appt with  as his last appt was on 10/22/19. Advised I would have the  call and get him scheduled for his appt. Wife  verbalized understanding.

## 2021-03-24 NOTE — TELEPHONE ENCOUNTER
Went on cover my meds and fill out info for [employer] [place] prior auth. It was approved from 3/24/21 -  3/24/22 PA # [id number]. Jeannie Gillis , wife on PHI, advised pt's [facility] [place] was approved until next year and he can get medication filled at pharmacy. Advised I will have the  contact her to get pt scheduled for his appt with . she verbalized understanding.

## 2021-04-29 ENCOUNTER — OFFICE VISIT (OUTPATIENT)
Dept: CARDIOLOGY CLINIC | Age: 51
End: 2021-04-29
Payer: COMMERCIAL

## 2021-04-29 DIAGNOSIS — I42.9 CARDIOMYOPATHY, UNSPECIFIED TYPE (HCC): ICD-10-CM

## 2021-04-29 DIAGNOSIS — Z95.810 PRESENCE OF AUTOMATIC CARDIOVERTER/DEFIBRILLATOR (AICD): Primary | ICD-10-CM

## 2021-04-29 PROCEDURE — 93296 REM INTERROG EVL PM/IDS: CPT | Performed by: INTERNAL MEDICINE

## 2021-04-29 PROCEDURE — 93295 DEV INTERROG REMOTE 1/2/MLT: CPT | Performed by: INTERNAL MEDICINE

## 2021-07-29 ENCOUNTER — OFFICE VISIT (OUTPATIENT)
Dept: CARDIOLOGY CLINIC | Age: 51
End: 2021-07-29
Payer: COMMERCIAL

## 2021-07-29 DIAGNOSIS — I42.9 CARDIOMYOPATHY, UNSPECIFIED TYPE (HCC): ICD-10-CM

## 2021-07-29 DIAGNOSIS — Z95.810 PRESENCE OF AUTOMATIC CARDIOVERTER/DEFIBRILLATOR (AICD): Primary | ICD-10-CM

## 2021-07-29 PROCEDURE — 93296 REM INTERROG EVL PM/IDS: CPT | Performed by: INTERNAL MEDICINE

## 2021-07-29 PROCEDURE — 93295 DEV INTERROG REMOTE 1/2/MLT: CPT | Performed by: INTERNAL MEDICINE

## 2021-11-11 ENCOUNTER — OFFICE VISIT (OUTPATIENT)
Dept: CARDIOLOGY CLINIC | Age: 51
End: 2021-11-11

## 2021-11-11 ENCOUNTER — OFFICE VISIT (OUTPATIENT)
Dept: CARDIOLOGY CLINIC | Age: 51
End: 2021-11-11
Payer: COMMERCIAL

## 2021-11-11 VITALS
RESPIRATION RATE: 18 BRPM | WEIGHT: 236 LBS | HEART RATE: 68 BPM | DIASTOLIC BLOOD PRESSURE: 70 MMHG | BODY MASS INDEX: 33.04 KG/M2 | SYSTOLIC BLOOD PRESSURE: 110 MMHG | OXYGEN SATURATION: 96 % | HEIGHT: 71 IN

## 2021-11-11 DIAGNOSIS — I49.9 IRREGULAR HEARTBEAT: Primary | ICD-10-CM

## 2021-11-11 DIAGNOSIS — I50.9 CONGESTIVE HEART FAILURE, UNSPECIFIED HF CHRONICITY, UNSPECIFIED HEART FAILURE TYPE (HCC): ICD-10-CM

## 2021-11-11 DIAGNOSIS — I42.9 CARDIOMYOPATHY, UNSPECIFIED TYPE (HCC): Primary | ICD-10-CM

## 2021-11-11 DIAGNOSIS — Z95.810 PRESENCE OF AUTOMATIC CARDIOVERTER/DEFIBRILLATOR (AICD): ICD-10-CM

## 2021-11-11 DIAGNOSIS — I49.9 IRREGULAR HEARTBEAT: ICD-10-CM

## 2021-11-11 DIAGNOSIS — I25.2 MYOCARDIAL INFARCT, OLD: ICD-10-CM

## 2021-11-11 DIAGNOSIS — I42.9 CARDIOMYOPATHY, UNSPECIFIED TYPE (HCC): ICD-10-CM

## 2021-11-11 PROCEDURE — 93282 PRGRMG EVAL IMPLANTABLE DFB: CPT | Performed by: INTERNAL MEDICINE

## 2021-11-11 PROCEDURE — 93000 ELECTROCARDIOGRAM COMPLETE: CPT | Performed by: NURSE PRACTITIONER

## 2021-11-11 PROCEDURE — 99215 OFFICE O/P EST HI 40 MIN: CPT | Performed by: NURSE PRACTITIONER

## 2021-11-11 NOTE — PROGRESS NOTES
ELECTROPHYSIOLOGY        Subjective:      Olivia Remy is a 46 y.o. male is here for EP follow up. The patient denies chest pain/ shortness of breath, orthopnea, PND, LE edema, palpitations, syncope, presyncope or fatigue. Some dizziness with positional changes. Patient Active Problem List    Diagnosis Date Noted    Physical exam 07/02/2020    S/P cardiac cath 09/18/2018    Myocardial infarct, old 09/12/2018    [facility]omy[facility] 07/16/2018      Neetu Win MD  Past Medical History:   Diagnosis Date    Atrial [facility] ([facility]     CAD (coronary artery disease) 08/2018    abnormal stress tests    [facility]    ICD (implantable cardioverter-defibrillator) in place     Myocardial infarction [facility])       Past Surgical History:   Procedure Laterality Date    HX IMPLANTABLE CARDIOVERTER DEFIBRILLATOR  11/19    HX OTHER SURGICAL      Cath     No Known Allergies   Family History   Problem Relation Age of Onset    Hypertension Mother     Alzheimer's Disease Father     negative for cardiac disease  Social History     Socioeconomic History    Marital status:    Tobacco Use    Smoking status: Current Some Day Smoker     Packs/day: 0.25     Years: 35.00     Pack years: 8.75     Types: Cigarettes     Last attempt to quit: 2/19/2018     Years since quitting: 3.7    Smokeless tobacco: Current User     Types: Chew    Tobacco comment: occasional cigarette- will smoke for a week than quit again    Substance and Sexual Activity    Alcohol use: Yes     Comment: social    Drug use: No    Sexual activity: Yes     Current Outpatient Medications   Medication Sig    furosemide (LASIX) 40 mg tablet TAKE ONE TABLET BY MOUTH DAILY    Entresto 24-26 mg tablet Take 1 Tab by mouth two (2) times a day.     carvediloL (COREG) 6.25 mg tablet TAKE ONE TABLET BY MOUTH 2 TIMES A DAY WITH MEALS (discontinue metoprolol)    potassium 99 mg tablet Take 99 mg by mouth daily.  aspirin (ASPIR-81 PO) Take 81 mg by mouth daily. No current facility-administered medications for this visit. Vitals:    21 0950   BP: 110/70   Pulse: 68   Resp: 18   SpO2: 96%   Weight: 236 lb (107 kg)   Height: 5' 11\" (1.803 m)       I have reviewed the nurses notes, vitals, problem list, allergy list, medical history, family, social history and medications. Review of Symptoms:  11 systems reviewed, negative other than as stated in the HPI      Physical Exam:      General: Well developed, in no acute distress. HEENT: Eyes - PERRL  Heart:  Normal S1/S2 negative S3 or S4. Regular, no murmur  Respiratory: Clear bilaterally x 4, no wheezing or rales  Extremities:  No edema, no cyanosis. Musculoskeletal: No clubbing  Neuro: A&Ox3, speech clear  Skin: No visible rashes or lesions. Non diaphoretic. No visible ulcers  Vascular: 2+ pulses symmetric in all extremities  Psych - judgement intact and orientation is wnl       Cardiographics    Ek21  Sinus incomplete  lbbb    No results found for this or any previous visit. Lab Results   Component Value Date/Time    WBC 7.6 2020 08:43 AM    HGB 14.3 2020 08:43 AM    HCT 41.9 2020 08:43 AM    PLATELET 705  08:43 AM    MCV 85 2020 08:43 AM      Lab Results   Component Value Date/Time    Sodium 140 2020 08:43 AM    Potassium 4.3 2020 08:43 AM    Chloride 105 2020 08:43 AM    CO2 21 2020 08:43 AM    Glucose 109 (H) 2020 08:43 AM    BUN 12 2020 08:43 AM    Creatinine 0.96 2020 08:43 AM    BUN/Creatinine ratio 13 2020 08:43 AM    GFR est  2020 08:43 AM    GFR est non-AA 91 2020 08:43 AM    Calcium 9.2 2020 08:43 AM    Bilirubin, total 0.3 2020 08:43 AM    Alk.  phosphatase 85 2020 08:43 AM    Protein, total 6.7 2020 08:43 AM    Albumin 4.4 2020 08:43 AM    A-G Ratio 1.9 2020 08:43 AM    ALT (SGPT) 24 07/02/2020 08:43 AM      No results found for: TSH, TSH2, TSH3, TSHP, TSHEXT, TSHEXT        Assessment:           ICD-10-CM ICD-9-CM    1. Cardiomyopathy, unspecified type ([address]  I42.9 425.4    2. Irregular heartbeat  I49.9 427.9    3. Myocardial infarct, old  I25.2 412    4. Presence of automatic cardioverter/defibrillator (AICD)  Z95.810 V45.02      No orders of the defined types were placed in this encounter. Plan:     Mr. Rose Marie Mart is here for follow up s/p ICD implant for dilated cardiomyopathy in 11/2018. He is feeling well. Device interrogation demonstrates normal functioning; brief NSVT -11, longest 30 bts, on bb and entresto. EF 20-25%, 2018. Will repeat echo. EKG normal sinus, incomplete LBBB. Will continue to monitor for progression of lbbb and possible biv icd upgrade. During this visit,  the patient and I had a comprehensive discussion of device management using principles of shared decision making. We reviewed device therapy, including the potential risks and benefits of device management. These risks include death, myocardial infarction, stroke, cardiac perforation, vascular injury, injury, pacing induced cardiomyopathy, inappropriate shocks (defibrillator) and other less severe complications. The patient demonstrated a clear understanding of these issues during out discussion. Our plans, determined together after thorough consideration, are outlined else where in this note. Enrolled in remote monitoring and pt will return for annual visit in 1 year. Addressed all patient questions and concerns at this visit. Thank you for allowing me to participate in [address] [place] [address]. Rain Stearns NP    Patient was made aware during visit today that all testing completed would be instantaneously available on their MyChart for review. Discussed that these results will be made available to the provider at the same time.   They were advised to wait at least 3 business days to allow for provider's interpretation of results with follow-up before calling our office with concerns about their results.

## 2021-11-11 NOTE — PROGRESS NOTES
Chief Complaint   Patient presents with    CHF     year FU-ICD implant-AFib. Denies Cardiac Symptoms     1. Have you been to the ER, urgent care clinic since your last visit? Hospitalized since your last visit? No  2. Have you seen or consulted any other health care providers outside of the [address] [place] [address] since your last visit? Include any pap smears or colon screening.  No

## 2021-11-16 ENCOUNTER — TELEPHONE (OUTPATIENT)
Dept: CARDIOLOGY CLINIC | Age: 51
End: 2021-11-16

## 2021-11-16 ENCOUNTER — HOSPITAL ENCOUNTER (OUTPATIENT)
Dept: ULTRASOUND IMAGING | Age: 51
Discharge: HOME | End: 2021-11-16
Attending: NURSE PRACTITIONER
Payer: COMMERCIAL

## 2021-11-16 DIAGNOSIS — I42.9 CARDIOMYOPATHY, UNSPECIFIED TYPE (HCC): ICD-10-CM

## 2021-11-16 DIAGNOSIS — I25.2 MYOCARDIAL INFARCT, OLD: ICD-10-CM

## 2021-11-16 DIAGNOSIS — I49.9 IRREGULAR HEARTBEAT: ICD-10-CM

## 2021-11-16 DIAGNOSIS — Z95.810 PRESENCE OF AUTOMATIC CARDIOVERTER/DEFIBRILLATOR (AICD): ICD-10-CM

## 2021-11-16 LAB
ECHO AO ROOT DIAM: 3.35 CM
ECHO EST RA PRESSURE: 10 MMHG
ECHO LA AREA 4C: 15.94 CM2
ECHO LA MAJOR AXIS: 3.7 CM
ECHO LA VOL 2C: 22.02 ML (ref 18–58)
ECHO LA VOL 4C: 31.84 ML (ref 18–58)
ECHO LA VOL BP: 36.49 ML (ref 18–58)
ECHO LV E' SEPTAL VELOCITY: 5.89 CM/S
ECHO LV EDV A2C: 157.75 ML
ECHO LV EDV A4C: 167.21 ML
ECHO LV EDV BP: 164.23 ML (ref 67–155)
ECHO LV EJECTION FRACTION A2C: 43 PERCENT
ECHO LV EJECTION FRACTION A4C: 41 PERCENT
ECHO LV EJECTION FRACTION BIPLANE: 40.8 PERCENT (ref 55–100)
ECHO LV ESV A2C: 89.42 ML
ECHO LV ESV A4C: 98.3 ML
ECHO LV ESV BP: 97.31 ML (ref 22–58)
ECHO LV INTERNAL DIMENSION DIASTOLIC: 6.13 CM (ref 4.2–5.9)
ECHO LV INTERNAL DIMENSION SYSTOLIC: 5.13 CM
ECHO LV IVSD: 0.93 CM (ref 0.6–1)
ECHO LV MASS 2D: 236.5 G (ref 88–224)
ECHO LV POSTERIOR WALL DIASTOLIC: 0.95 CM (ref 0.6–1)
ECHO LVOT DIAM: 2.13 CM
ECHO LVOT PEAK GRADIENT: 5.57 MMHG
ECHO MV A VELOCITY: 59.97 CM/S
ECHO MV E DECELERATION TIME (DT): 226.85 MS
ECHO MV E VELOCITY: 59.3 CM/S
ECHO MV E/A RATIO: 0.99
ECHO MV E/E' SEPTAL: 10.07
ECHO RIGHT VENTRICULAR SYSTOLIC PRESSURE (RVSP): 21.21 MMHG
ECHO TV REGURGITANT MAX VELOCITY: 117.98 CM/S
ECHO TV REGURGITANT MAX VELOCITY: 167.41 CM/S
ECHO TV REGURGITANT PEAK GRADIENT: 11.21 MMHG

## 2021-11-16 PROCEDURE — 93306 TTE W/DOPPLER COMPLETE: CPT

## 2021-11-16 PROCEDURE — 93306 TTE W/DOPPLER COMPLETE: CPT | Performed by: INTERNAL MEDICINE

## 2021-11-16 NOTE — TELEPHONE ENCOUNTER
----- Message from Holli Pereira, ANP sent at 11/16/2021  3:28 PM EST -----  Your heart function remains low at 25-30% (slightly up from 20-25%).  Otherwise normal.

## 2022-02-10 ENCOUNTER — OFFICE VISIT (OUTPATIENT)
Dept: CARDIOLOGY CLINIC | Age: 52
End: 2022-02-10
Payer: COMMERCIAL

## 2022-02-10 DIAGNOSIS — I50.9 CONGESTIVE HEART FAILURE, UNSPECIFIED HF CHRONICITY, UNSPECIFIED HEART FAILURE TYPE (HCC): ICD-10-CM

## 2022-02-10 DIAGNOSIS — Z95.810 PRESENCE OF AUTOMATIC CARDIOVERTER/DEFIBRILLATOR (AICD): Primary | ICD-10-CM

## 2022-02-10 DIAGNOSIS — I42.9 CARDIOMYOPATHY, UNSPECIFIED TYPE (HCC): ICD-10-CM

## 2022-02-10 PROCEDURE — 93295 DEV INTERROG REMOTE 1/2/MLT: CPT | Performed by: INTERNAL MEDICINE

## 2022-02-10 PROCEDURE — 93296 REM INTERROG EVL PM/IDS: CPT | Performed by: INTERNAL MEDICINE

## 2022-02-18 RX ORDER — CARVEDILOL 6.25 MG/1
TABLET ORAL
Qty: 180 TABLET | Refills: 0 | Status: SHIPPED | OUTPATIENT
Start: 2022-02-18

## 2022-03-18 PROBLEM — Z00.00 PHYSICAL EXAM: Status: ACTIVE | Noted: 2020-07-02

## 2022-03-19 PROBLEM — I42.9 CARDIOMYOPATHY (HCC): Status: ACTIVE | Noted: 2018-07-16

## 2022-03-20 PROBLEM — Z98.890 S/P CARDIAC CATH: Status: ACTIVE | Noted: 2018-09-18

## 2022-03-20 PROBLEM — I25.2 MYOCARDIAL INFARCT, OLD: Status: ACTIVE | Noted: 2018-09-12

## 2023-07-06 ENCOUNTER — TELEPHONE (OUTPATIENT)
Facility: HOSPITAL | Age: 53
End: 2023-07-06

## 2023-07-06 NOTE — TELEPHONE ENCOUNTER
I am notifying you as a valued participant in the APPRAISE ATP clinical study that they are pleased to announce that the [address] APPRAISE APT clinical study has reached it's primary endpoint goal; therefore, the study is now closed. This will effectively end your participation in the study. emere, [facility] would like to sincerely express our gratitude for your participation in the very important clinical study. The knowledge gained through your participation will help to inform the scientific community, physicians, and other patients like you on the role of anti-tachycardia pacing (ATP).

## 2023-10-05 ENCOUNTER — ANESTHESIA EVENT (OUTPATIENT)
Facility: HOSPITAL | Age: 53
End: 2023-10-05
Payer: COMMERCIAL

## 2023-10-05 RX ORDER — SODIUM CHLORIDE 0.9 % (FLUSH) 0.9 %
5-40 SYRINGE (ML) INJECTION EVERY 12 HOURS SCHEDULED
Status: CANCELLED | OUTPATIENT
Start: 2023-10-05

## 2023-10-05 RX ORDER — MIDAZOLAM HYDROCHLORIDE 2 MG/2ML
2 INJECTION, SOLUTION INTRAMUSCULAR; INTRAVENOUS
Status: CANCELLED | OUTPATIENT
Start: 2023-10-05 | End: 2023-10-06

## 2023-10-05 RX ORDER — OXYCODONE HYDROCHLORIDE 5 MG/1
5 TABLET ORAL
Status: CANCELLED | OUTPATIENT
Start: 2023-10-05 | End: 2023-10-06

## 2023-10-05 RX ORDER — LIDOCAINE HYDROCHLORIDE 10 MG/ML
1 INJECTION, SOLUTION EPIDURAL; INFILTRATION; INTRACAUDAL; PERINEURAL
Status: CANCELLED | OUTPATIENT
Start: 2023-10-05 | End: 2023-10-06

## 2023-10-05 RX ORDER — SODIUM CHLORIDE, SODIUM LACTATE, POTASSIUM CHLORIDE, CALCIUM CHLORIDE 600; 310; 30; 20 MG/100ML; MG/100ML; MG/100ML; MG/100ML
INJECTION, SOLUTION INTRAVENOUS CONTINUOUS
Status: CANCELLED | OUTPATIENT
Start: 2023-10-05

## 2023-10-05 RX ORDER — SODIUM CHLORIDE 9 MG/ML
INJECTION, SOLUTION INTRAVENOUS PRN
Status: CANCELLED | OUTPATIENT
Start: 2023-10-05

## 2023-10-05 RX ORDER — HYDROMORPHONE HYDROCHLORIDE 1 MG/ML
0.5 INJECTION, SOLUTION INTRAMUSCULAR; INTRAVENOUS; SUBCUTANEOUS EVERY 5 MIN PRN
Status: CANCELLED | OUTPATIENT
Start: 2023-10-05

## 2023-10-05 RX ORDER — FENTANYL CITRATE 50 UG/ML
100 INJECTION, SOLUTION INTRAMUSCULAR; INTRAVENOUS
Status: CANCELLED | OUTPATIENT
Start: 2023-10-05 | End: 2023-10-06

## 2023-10-05 RX ORDER — FENTANYL CITRATE 50 UG/ML
25 INJECTION, SOLUTION INTRAMUSCULAR; INTRAVENOUS EVERY 5 MIN PRN
Status: CANCELLED | OUTPATIENT
Start: 2023-10-05

## 2023-10-05 RX ORDER — ACETAMINOPHEN 500 MG
1000 TABLET ORAL ONCE
Status: CANCELLED | OUTPATIENT
Start: 2023-10-05 | End: 2023-10-05

## 2023-10-05 RX ORDER — SODIUM CHLORIDE 0.9 % (FLUSH) 0.9 %
5-40 SYRINGE (ML) INJECTION PRN
Status: CANCELLED | OUTPATIENT
Start: 2023-10-05

## 2023-10-05 RX ORDER — ONDANSETRON 2 MG/ML
4 INJECTION INTRAMUSCULAR; INTRAVENOUS
Status: CANCELLED | OUTPATIENT
Start: 2023-10-05 | End: 2023-10-06

## 2023-10-05 RX ORDER — HYDRALAZINE HYDROCHLORIDE 20 MG/ML
10 INJECTION INTRAMUSCULAR; INTRAVENOUS
Status: CANCELLED | OUTPATIENT
Start: 2023-10-05

## 2023-10-05 RX ORDER — PROCHLORPERAZINE EDISYLATE 5 MG/ML
5 INJECTION INTRAMUSCULAR; INTRAVENOUS
Status: CANCELLED | OUTPATIENT
Start: 2023-10-05 | End: 2023-10-06

## 2023-10-06 ENCOUNTER — HOSPITAL ENCOUNTER (OUTPATIENT)
Facility: HOSPITAL | Age: 53
Discharge: HOME | End: 2023-10-06
Attending: INTERNAL MEDICINE | Admitting: INTERNAL MEDICINE
Payer: COMMERCIAL

## 2023-10-06 ENCOUNTER — ANESTHESIA (OUTPATIENT)
Facility: HOSPITAL | Age: 53
End: 2023-10-06
Payer: COMMERCIAL

## 2023-10-06 VITALS
HEIGHT: 71 IN | WEIGHT: 237 LBS | HEART RATE: 72 BPM | RESPIRATION RATE: 18 BRPM | OXYGEN SATURATION: 98 % | DIASTOLIC BLOOD PRESSURE: 77 MMHG | SYSTOLIC BLOOD PRESSURE: 120 MMHG | TEMPERATURE: 98 F | BODY MASS INDEX: 33.18 KG/M2

## 2023-10-06 DIAGNOSIS — I47.10 SUPRAVENTRICULAR TACHYCARDIA: ICD-10-CM

## 2023-10-06 LAB
ACT BLD: 95 SECS (ref 79–138)
ECHO BSA: 2.32 M2

## 2023-10-06 PROCEDURE — 93653 COMPRE EP EVAL TX SVT: CPT | Performed by: INTERNAL MEDICINE

## 2023-10-06 PROCEDURE — C1731 CATH, EP, 20 OR MORE ELEC: HCPCS | Performed by: INTERNAL MEDICINE

## 2023-10-06 PROCEDURE — 2720000010 HC SURG SUPPLY STERILE: Performed by: INTERNAL MEDICINE

## 2023-10-06 PROCEDURE — 2500000003 HC RX 250 WO HCPCS: Performed by: INTERNAL MEDICINE

## 2023-10-06 PROCEDURE — 93662 INTRACARDIAC ECG (ICE): CPT

## 2023-10-06 PROCEDURE — 93462 L HRT CATH TRNSPTL PUNCTURE: CPT | Performed by: INTERNAL MEDICINE

## 2023-10-06 PROCEDURE — 6360000002 HC RX W HCPCS: Performed by: NURSE ANESTHETIST, CERTIFIED REGISTERED

## 2023-10-06 PROCEDURE — 3700000001 HC ADD 15 MINUTES (ANESTHESIA): Performed by: INTERNAL MEDICINE

## 2023-10-06 PROCEDURE — C1730 CATH, EP, 19 OR FEW ELECT: HCPCS | Performed by: INTERNAL MEDICINE

## 2023-10-06 PROCEDURE — C1894 INTRO/SHEATH, NON-LASER: HCPCS | Performed by: INTERNAL MEDICINE

## 2023-10-06 PROCEDURE — C1732 CATH, EP, DIAG/ABL, 3D/VECT: HCPCS | Performed by: INTERNAL MEDICINE

## 2023-10-06 PROCEDURE — C1759 CATH, INTRA ECHOCARDIOGRAPHY: HCPCS | Performed by: INTERNAL MEDICINE

## 2023-10-06 PROCEDURE — 3700000000 HC ANESTHESIA ATTENDED CARE: Performed by: INTERNAL MEDICINE

## 2023-10-06 PROCEDURE — C1893 INTRO/SHEATH, FIXED,NON-PEEL: HCPCS | Performed by: INTERNAL MEDICINE

## 2023-10-06 PROCEDURE — C1766 INTRO/SHEATH,STRBLE,NON-PEEL: HCPCS | Performed by: INTERNAL MEDICINE

## 2023-10-06 PROCEDURE — 2580000003 HC RX 258: Performed by: INTERNAL MEDICINE

## 2023-10-06 PROCEDURE — 2580000003 HC RX 258: Performed by: NURSE ANESTHETIST, CERTIFIED REGISTERED

## 2023-10-06 PROCEDURE — 2709999900 HC NON-CHARGEABLE SUPPLY: Performed by: INTERNAL MEDICINE

## 2023-10-06 PROCEDURE — 85347 COAGULATION TIME ACTIVATED: CPT

## 2023-10-06 RX ORDER — ASPIRIN 81 MG/1
81 TABLET, CHEWABLE ORAL DAILY
COMMUNITY

## 2023-10-06 RX ORDER — FENTANYL CITRATE 50 UG/ML
INJECTION, SOLUTION INTRAMUSCULAR; INTRAVENOUS PRN
Status: DISCONTINUED | OUTPATIENT
Start: 2023-10-06 | End: 2023-10-06 | Stop reason: SDUPTHER

## 2023-10-06 RX ORDER — MIDAZOLAM HYDROCHLORIDE 1 MG/ML
INJECTION INTRAMUSCULAR; INTRAVENOUS PRN
Status: DISCONTINUED | OUTPATIENT
Start: 2023-10-06 | End: 2023-10-06 | Stop reason: SDUPTHER

## 2023-10-06 RX ORDER — M-VIT,TX,IRON,MINS/CALC/FOLIC 27MG-0.4MG
1 TABLET ORAL DAILY
COMMUNITY

## 2023-10-06 RX ORDER — SODIUM CHLORIDE 0.9 % (FLUSH) 0.9 %
5-40 SYRINGE (ML) INJECTION EVERY 12 HOURS SCHEDULED
Status: DISCONTINUED | OUTPATIENT
Start: 2023-10-06 | End: 2023-10-06 | Stop reason: HOSPADM

## 2023-10-06 RX ORDER — PHENYLEPHRINE HCL IN 0.9% NACL 0.4MG/10ML
SYRINGE (ML) INTRAVENOUS PRN
Status: DISCONTINUED | OUTPATIENT
Start: 2023-10-06 | End: 2023-10-06 | Stop reason: SDUPTHER

## 2023-10-06 RX ORDER — SODIUM CHLORIDE 0.9 % (FLUSH) 0.9 %
5-40 SYRINGE (ML) INJECTION PRN
Status: DISCONTINUED | OUTPATIENT
Start: 2023-10-06 | End: 2023-10-06 | Stop reason: HOSPADM

## 2023-10-06 RX ORDER — SACUBITRIL AND VALSARTAN 24; 26 MG/1; MG/1
1 TABLET, FILM COATED ORAL 2 TIMES DAILY
COMMUNITY

## 2023-10-06 RX ORDER — HEPARIN SODIUM 10000 [USP'U]/ML
INJECTION, SOLUTION INTRAVENOUS; SUBCUTANEOUS PRN
Status: DISCONTINUED | OUTPATIENT
Start: 2023-10-06 | End: 2023-10-06 | Stop reason: HOSPADM

## 2023-10-06 RX ORDER — SODIUM CHLORIDE 9 MG/ML
INJECTION, SOLUTION INTRAVENOUS PRN
Status: DISCONTINUED | OUTPATIENT
Start: 2023-10-06 | End: 2023-10-06 | Stop reason: HOSPADM

## 2023-10-06 RX ORDER — ACETAMINOPHEN 325 MG/1
650 TABLET ORAL EVERY 4 HOURS PRN
Status: DISCONTINUED | OUTPATIENT
Start: 2023-10-06 | End: 2023-10-06 | Stop reason: HOSPADM

## 2023-10-06 RX ORDER — FUROSEMIDE 40 MG/1
40 TABLET ORAL DAILY
COMMUNITY

## 2023-10-06 RX ORDER — HEPARIN SODIUM 1000 [USP'U]/ML
INJECTION, SOLUTION INTRAVENOUS; SUBCUTANEOUS PRN
Status: DISCONTINUED | OUTPATIENT
Start: 2023-10-06 | End: 2023-10-06 | Stop reason: SDUPTHER

## 2023-10-06 RX ORDER — CARVEDILOL 6.25 MG/1
6.25 TABLET ORAL 2 TIMES DAILY WITH MEALS
COMMUNITY

## 2023-10-06 RX ORDER — PROTAMINE SULFATE 10 MG/ML
INJECTION, SOLUTION INTRAVENOUS PRN
Status: DISCONTINUED | OUTPATIENT
Start: 2023-10-06 | End: 2023-10-06 | Stop reason: SDUPTHER

## 2023-10-06 RX ADMIN — Medication 80 MCG: at 08:36

## 2023-10-06 RX ADMIN — PHENYLEPHRINE HYDROCHLORIDE 40 MCG/MIN: 10 INJECTION INTRAVENOUS at 08:40

## 2023-10-06 RX ADMIN — PROTAMINE SULFATE 100 MG: 10 INJECTION, SOLUTION INTRAVENOUS at 09:30

## 2023-10-06 RX ADMIN — Medication 80 MCG: at 08:42

## 2023-10-06 RX ADMIN — PROPOFOL 50 MCG/KG/MIN: 10 INJECTION, EMULSION INTRAVENOUS at 08:28

## 2023-10-06 RX ADMIN — HEPARIN SODIUM 15000 UNITS: 1000 INJECTION, SOLUTION INTRAVENOUS; SUBCUTANEOUS at 09:16

## 2023-10-06 RX ADMIN — Medication 80 MCG: at 08:39

## 2023-10-06 RX ADMIN — MIDAZOLAM HYDROCHLORIDE 2 MG: 1 INJECTION, SOLUTION INTRAMUSCULAR; INTRAVENOUS at 08:28

## 2023-10-06 RX ADMIN — FENTANYL CITRATE 50 MCG: 50 INJECTION, SOLUTION INTRAMUSCULAR; INTRAVENOUS at 08:28

## 2023-10-06 RX ADMIN — Medication 100 MCG: at 08:55

## 2023-10-06 NOTE — PROGRESS NOTES
Cardiac Cath Lab Recovery Arrival Note:      Blessing Burns Sr arrived to Cardiac Cath Lab, Recovery Area. Staff introduced to patient. Patient identifiers verified with NAME and DATE OF BIRTH. Procedure verified with patient. Consent forms reviewed and signed by patient or authorized representative and verified. Allergies verified. Patient and family oriented to department. Patient and family informed of procedure and plan of care. Questions answered with review. Patient prepped for procedure, per orders from physician, prior to arrival.    Patient on cardiac monitor, non-invasive blood pressure, SPO2 monitor. On RA. Patient is A&Ox 4. Patient reports no complaints. Patient in stretcher, in low position, with side rails up, call bell within reach, patient instructed to call if assistance as needed. Patient prep in: [facility]. Patient family has pager # none  [facility] Prep by: kiana tello and chana tello     Dual skin assessment done; no open wounds or abrasions.

## 2025-02-25 NOTE — ANESTHESIA POSTPROCEDURE EVALUATION
Problem: Dementia Signs/Symptoms  Goal: Improved Behavioral Control (Dementia Signs/Symptoms)  Outcome: Progressing  Goal: Improved Mood Symptoms (Dementia Signs/Symptoms)  Outcome: Progressing  Goal: Optimized Cognitive Function (Dementia Signs/Symptoms)  Outcome: Progressing  Goal: Optimized Oral Intake (Dementia Signs/Symptoms)  Outcome: Progressing  Goal: Improved Sleep (Dementia Signs/Symptoms)  Outcome: Progressing  Goal: Enhanced Social or Functional Skills and Ability (Dementia Signs/Symptoms)  Outcome: Progressing  Intervention: Promote Social and Functional Ability  Recent Flowsheet Documentation  Taken 2/25/2025 0900 by Katharine Arreola RN  Trust Relationship/Rapport: care explained     Problem: Heart Failure  Goal: Optimal Coping  Outcome: Progressing  Goal: Optimal Cardiac Output  Outcome: Progressing  Goal: Stable Heart Rate and Rhythm  Outcome: Progressing  Goal: Fluid and Electrolyte Balance  Outcome: Progressing  Goal: Optimal Functional Ability  Outcome: Progressing  Intervention: Optimize Functional Ability  Recent Flowsheet Documentation  Taken 2/25/2025 0900 by Katharine Arreola RN  Activity Management:   activity adjusted per tolerance   up ad conrado  Goal: Improved Oral Intake  Outcome: Progressing  Goal: Effective Oxygenation and Ventilation  Outcome: Progressing  Intervention: Promote Airway Secretion Clearance  Recent Flowsheet Documentation  Taken 2/25/2025 0900 by Katharine Arreola RN  Cough And Deep Breathing: refused  Activity Management:   activity adjusted per tolerance   up ad conrado  Intervention: Optimize Oxygenation and Ventilation  Recent Flowsheet Documentation  Taken 2/25/2025 1100 by Katharine Arreola RN  Head of Bed (HOB) Positioning: HOB at 20-30 degrees  Taken 2/25/2025 0730 by Katharine Arreola RN  Head of Bed (HOB) Positioning: HOB at 20-30 degrees  Goal: Effective Breathing Pattern During Sleep  Outcome: Progressing  Intervention: Monitor and Manage  * No procedures listed *. Anesthesia Post Evaluation Patient location during evaluation: PACU Note status: Adequate. Level of consciousness: responsive to verbal stimuli and sleepy but conscious Pain management: satisfactory to patient Airway patency: patent Anesthetic complications: no 
Cardiovascular status: acceptable Respiratory status: acceptable Hydration status: acceptable Comments: +Post-Anesthesia Evaluation and Assessment Patient: Rosario Dominguez MRN: [id number]  SSN: [id number] YOB: 1970  Age: 46 y.o. Sex: male Cardiovascular Function/Vital Signs BP (!) 82/61 (BP 1 Location: Right arm, BP Patient Position: At rest)   Pulse 80   Temp 36.4 °C (97.6 °F)   Resp 20   Ht 5' 11\" (1.803 m)   Wt 102.1 kg (225 lb)   SpO2 97%   BMI 31.38 kg/m² Patient is status post * No procedures listed *. Nausea/Vomiting: Controlled. Postoperative hydration reviewed and adequate. Pain: 
Pain Scale 1: Numeric (0 - 10) (11/01/18 1000) Pain Intensity 1: 0 (11/01/18 1000) Managed. Neurological Status: At baseline. Mental Status and Level of Consciousness: Arousable. Pulmonary Status:  
O2 Device: Room air (11/01/18 1020) Adequate oxygenation and airway patent. Complications related to anesthesia: None Post-anesthesia assessment completed. No concerns. Signed By: Yogi Smith MD  
 11/1/2018 Visit Vitals BP (!) 82/61 (BP 1 Location: Right arm, BP Patient Position: At rest) Pulse 80 Temp 36.4 °C (97.6 °F) Resp 20 Ht 5' 11\" (1.803 m) Wt 102.1 kg (225 lb) SpO2 97% BMI 31.38 kg/m² Obstructive Sleep Apnea  Recent Flowsheet Documentation  Taken 2/25/2025 0900 by Katharine Arreola RN  Medication Review/Management: medications reviewed     Problem: Comorbidity Management  Goal: Maintenance of Heart Failure Symptom Control  Outcome: Progressing  Intervention: Maintain Heart Failure Management  Recent Flowsheet Documentation  Taken 2/25/2025 0900 by Katharine Arreola RN  Medication Review/Management: medications reviewed  Goal: Blood Pressure in Desired Range  Outcome: Progressing  Intervention: Maintain Blood Pressure Management  Recent Flowsheet Documentation  Taken 2/25/2025 0900 by Katharine Arreola RN  Medication Review/Management: medications reviewed   Goal Outcome Evaluation:       Patient is alert to self but otherwise confused. Reports back/hip pain when moving. Up to chair this morning with therapy assist of two. VSS. Family at bedside and updated. Plan is for discharge to TCU tomorrow.

## (undated) DEVICE — PINNACLE R/O II INTRODUCER SHEATH WITH RADIOPAQUE MARKER: Brand: PINNACLE

## (undated) DEVICE — CABLE EP L150CM RED HEXAPOLAR OCTAPOLAR DECAPOLAR EXTN CONN

## (undated) DEVICE — PATCH REF EXT FOR CARTO 3 SYS (EA = 6 PACKS)

## (undated) DEVICE — CATHETER ELECTROPHYSIOLOGY LG 2-8-2 MM 7 FRX95 CM LIVEWIRE

## (undated) DEVICE — CABLE CATH L10FT RED PIN CONN 34-34 FOR THERMOCOOL

## (undated) DEVICE — HEART CATH-MRMC: Brand: MEDLINE INDUSTRIES, INC.

## (undated) DEVICE — PINNACLE INTRODUCER SHEATH: Brand: PINNACLE

## (undated) DEVICE — CATHETER ULTRASOUND 10 FRX90 CM FOR CARTO 3 SOUNDSTAR ECO

## (undated) DEVICE — CABLE CATH L10FT RED PIN CONN 25-34 FOR NAVISTAR CARTO 3

## (undated) DEVICE — CABLE EP L150CM BLK HEXAPOLAR OCTAPOLAR DECAPOLAR EXTN CONN

## (undated) DEVICE — ELECTRODE PT RET AD L9FT HI MOIST COND ADH HYDRGEL CORDED

## (undated) DEVICE — SHEATH GUID 11.5X8.5FR L71MM M CRV L22MM BIDIR STEER CARTO

## (undated) DEVICE — CATHETER ELECTROPHYSIOLOGY JSN 2-5-2 MM 6 FRX120 CM SUPREME

## (undated) DEVICE — TUBING PMP FOR CARTO SYS SMARTABLATE

## (undated) DEVICE — 1 X VERSACROSS TRANSSEPTAL SHEATH (INCLUDING  1 X J-TIP GUIDEWIRE); 1 X VERSACROSS RF WIRE (INCLUDING 1 X CONNECTOR CABLE (SINGLE USE)); 1 X DISPERSIVE ELECTRODE: Brand: VERSACROSS ACCESS SOLUTION

## (undated) DEVICE — CABLE EP L150CM RED CONNECTS W/ 4FR SUPREME BPLR QPLR CATH

## (undated) DEVICE — STERILE (15.2 TAPERED TO 7.6 X 183CM) POLYETHYLENE ACCORDION-FOLDED COVER FOR USE WITH SIEMENS ACUNAV ULTRASOUND CATHETER FAMILY CONNECTOR: Brand: SWIFTLINK TRANSDUCER COVER

## (undated) DEVICE — CATHETER ABLAT 8FR L115CM 1-6-2MM SPC TIP 3.5MM FJ CRV

## (undated) DEVICE — DRESSING HEMSTAT W12XL12IN 3 PLY QUIKCLOT CONTROL+

## (undated) DEVICE — CATHETER EP L120CM OD6FR 2-5-2MM SPC CRD QPLR ELECTRD